# Patient Record
Sex: FEMALE | Race: WHITE | NOT HISPANIC OR LATINO | Employment: FULL TIME | URBAN - METROPOLITAN AREA
[De-identification: names, ages, dates, MRNs, and addresses within clinical notes are randomized per-mention and may not be internally consistent; named-entity substitution may affect disease eponyms.]

---
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
## 2021-05-15 VITALS — HEART RATE: 82 BPM | BODY MASS INDEX: 27.2 KG/M2 | WEIGHT: 190 LBS | OXYGEN SATURATION: 98 % | HEIGHT: 70 IN

## 2021-05-15 VITALS — OXYGEN SATURATION: 98 % | WEIGHT: 190 LBS | HEART RATE: 71 BPM | BODY MASS INDEX: 27.2 KG/M2 | HEIGHT: 70 IN

## 2021-05-15 VITALS — HEART RATE: 70 BPM | HEIGHT: 70 IN | OXYGEN SATURATION: 99 %

## 2021-05-15 VITALS — HEIGHT: 70 IN | WEIGHT: 160 LBS | BODY MASS INDEX: 22.9 KG/M2

## 2021-05-24 LAB
EXTERNAL ABO GROUPING: NORMAL
EXTERNAL HEPATITIS B SURFACE ANTIGEN: NEGATIVE
EXTERNAL RH FACTOR: POSITIVE
EXTERNAL RUBELLA QUALITATIVE: NORMAL
EXTERNAL VDRL: NORMAL
HIV1 P24 AG SERPL QL IA: NORMAL

## 2021-06-14 ENCOUNTER — HOSPITAL ENCOUNTER (EMERGENCY)
Facility: HOSPITAL | Age: 21
Discharge: HOME | End: 2021-06-14
Admitting: EMERGENCY MEDICINE

## 2021-06-14 VITALS
WEIGHT: 179.9 LBS | TEMPERATURE: 98.7 F | DIASTOLIC BLOOD PRESSURE: 68 MMHG | RESPIRATION RATE: 18 BRPM | HEIGHT: 72 IN | BODY MASS INDEX: 24.37 KG/M2 | OXYGEN SATURATION: 99 % | HEART RATE: 78 BPM | SYSTOLIC BLOOD PRESSURE: 128 MMHG

## 2021-06-14 DIAGNOSIS — O21.9: ICD-10-CM

## 2021-06-14 DIAGNOSIS — R30.0 DYSURIA: Primary | ICD-10-CM

## 2021-06-14 DIAGNOSIS — Z34.90 PREGNANCY, UNSPECIFIED GESTATIONAL AGE: ICD-10-CM

## 2021-06-14 LAB
ALBUMIN SERPL-MCNC: 4.3 G/DL (ref 3.5–5.2)
ALBUMIN/GLOB SERPL: 1.4 G/DL
ALP SERPL-CCNC: 66 U/L (ref 39–117)
ALT SERPL W P-5'-P-CCNC: 15 U/L (ref 1–33)
ANION GAP SERPL CALCULATED.3IONS-SCNC: 11.3 MMOL/L (ref 5–15)
AST SERPL-CCNC: 18 U/L (ref 1–32)
BASOPHILS # BLD AUTO: 0.03 10*3/MM3 (ref 0–0.2)
BASOPHILS NFR BLD AUTO: 0.3 % (ref 0–1.5)
BILIRUB SERPL-MCNC: 0.2 MG/DL (ref 0–1.2)
BILIRUB UR QL STRIP: NEGATIVE
BUN SERPL-MCNC: 5 MG/DL (ref 6–20)
BUN/CREAT SERPL: 9.8 (ref 7–25)
CALCIUM SPEC-SCNC: 9.4 MG/DL (ref 8.6–10.5)
CHLORIDE SERPL-SCNC: 103 MMOL/L (ref 98–107)
CLARITY UR: CLEAR
CO2 SERPL-SCNC: 20.7 MMOL/L (ref 22–29)
COLOR UR: YELLOW
CREAT SERPL-MCNC: 0.51 MG/DL (ref 0.57–1)
DEPRECATED RDW RBC AUTO: 40.3 FL (ref 37–54)
EOSINOPHIL # BLD AUTO: 0.27 10*3/MM3 (ref 0–0.4)
EOSINOPHIL NFR BLD AUTO: 3 % (ref 0.3–6.2)
ERYTHROCYTE [DISTWIDTH] IN BLOOD BY AUTOMATED COUNT: 13.1 % (ref 12.3–15.4)
GFR SERPL CREATININE-BSD FRML MDRD: >150 ML/MIN/1.73
GLOBULIN UR ELPH-MCNC: 3 GM/DL
GLUCOSE SERPL-MCNC: 85 MG/DL (ref 65–99)
GLUCOSE UR STRIP-MCNC: NEGATIVE MG/DL
HCG INTACT+B SERPL-ACNC: NORMAL MIU/ML
HCT VFR BLD AUTO: 39.6 % (ref 34–46.6)
HGB BLD-MCNC: 13.1 G/DL (ref 12–15.9)
HGB UR QL STRIP.AUTO: NEGATIVE
HOLD SPECIMEN: NORMAL
HOLD SPECIMEN: NORMAL
IMM GRANULOCYTES # BLD AUTO: 0.02 10*3/MM3 (ref 0–0.05)
IMM GRANULOCYTES NFR BLD AUTO: 0.2 % (ref 0–0.5)
KETONES UR QL STRIP: NEGATIVE
LEUKOCYTE ESTERASE UR QL STRIP.AUTO: NEGATIVE
LIPASE SERPL-CCNC: 33 U/L (ref 13–60)
LYMPHOCYTES # BLD AUTO: 2.29 10*3/MM3 (ref 0.7–3.1)
LYMPHOCYTES NFR BLD AUTO: 25.8 % (ref 19.6–45.3)
MCH RBC QN AUTO: 28.1 PG (ref 26.6–33)
MCHC RBC AUTO-ENTMCNC: 33.1 G/DL (ref 31.5–35.7)
MCV RBC AUTO: 85 FL (ref 79–97)
MONOCYTES # BLD AUTO: 0.6 10*3/MM3 (ref 0.1–0.9)
MONOCYTES NFR BLD AUTO: 6.8 % (ref 5–12)
NEUTROPHILS NFR BLD AUTO: 5.67 10*3/MM3 (ref 1.7–7)
NEUTROPHILS NFR BLD AUTO: 63.9 % (ref 42.7–76)
NITRITE UR QL STRIP: NEGATIVE
NRBC BLD AUTO-RTO: 0 /100 WBC (ref 0–0.2)
PH UR STRIP.AUTO: 7.5 [PH] (ref 5–8)
PLATELET # BLD AUTO: 187 10*3/MM3 (ref 140–450)
PMV BLD AUTO: 10.4 FL (ref 6–12)
POTASSIUM SERPL-SCNC: 3.8 MMOL/L (ref 3.5–5.2)
PROT SERPL-MCNC: 7.3 G/DL (ref 6–8.5)
PROT UR QL STRIP: NEGATIVE
RBC # BLD AUTO: 4.66 10*6/MM3 (ref 3.77–5.28)
SODIUM SERPL-SCNC: 135 MMOL/L (ref 136–145)
SP GR UR STRIP: 1.01 (ref 1–1.03)
UROBILINOGEN UR QL STRIP: NORMAL
WBC # BLD AUTO: 8.88 10*3/MM3 (ref 3.4–10.8)
WHOLE BLOOD HOLD SPECIMEN: NORMAL

## 2021-06-14 PROCEDURE — 63710000001 ONDANSETRON ODT 4 MG TABLET DISPERSIBLE: Performed by: NURSE PRACTITIONER

## 2021-06-14 PROCEDURE — 84702 CHORIONIC GONADOTROPIN TEST: CPT

## 2021-06-14 PROCEDURE — 80053 COMPREHEN METABOLIC PANEL: CPT

## 2021-06-14 PROCEDURE — 81003 URINALYSIS AUTO W/O SCOPE: CPT

## 2021-06-14 PROCEDURE — 85025 COMPLETE CBC W/AUTO DIFF WBC: CPT

## 2021-06-14 PROCEDURE — 99283 EMERGENCY DEPT VISIT LOW MDM: CPT

## 2021-06-14 PROCEDURE — 83690 ASSAY OF LIPASE: CPT

## 2021-06-14 RX ORDER — SODIUM CHLORIDE 0.9 % (FLUSH) 0.9 %
10 SYRINGE (ML) INJECTION AS NEEDED
Status: DISCONTINUED | OUTPATIENT
Start: 2021-06-14 | End: 2021-06-14 | Stop reason: HOSPADM

## 2021-06-14 RX ORDER — PRENATAL VIT NO.126/IRON/FOLIC 28MG-0.8MG
TABLET ORAL DAILY
COMMUNITY
End: 2022-03-16

## 2021-06-14 RX ORDER — ONDANSETRON 4 MG/1
4 TABLET, ORALLY DISINTEGRATING ORAL ONCE
Status: COMPLETED | OUTPATIENT
Start: 2021-06-14 | End: 2021-06-14

## 2021-06-14 RX ADMIN — ONDANSETRON 4 MG: 4 TABLET, ORALLY DISINTEGRATING ORAL at 07:48

## 2021-06-14 RX ADMIN — SODIUM CHLORIDE 1000 ML: 9 INJECTION, SOLUTION INTRAVENOUS at 07:47

## 2021-06-14 NOTE — ED NOTES
Patient is 14 weeks pregnant. Dr. Chappell at [facility]. Due Date December 9th     Brenda Kline RN  06/14/21 0590

## 2021-06-14 NOTE — ED PROVIDER NOTES
Subjective   Dysuria and vomiting.  14 weeks pregnant (dr. presley)      Dysuria  Pain quality:  Burning  Pain severity:  No pain  Onset quality:  Sudden  Duration: last night.  Timing:  Constant  Progression:  Unchanged  Chronicity:  New  Recent urinary tract infections: no    Relieved by:  None tried  Worsened by:  Nothing  Ineffective treatments:  None tried  Urinary symptoms: discolored urine, foul-smelling urine and frequent urination    Associated symptoms: nausea and vomiting    Associated symptoms: no fever, no flank pain and no vaginal discharge    Risk factors: pregnant        Review of Systems   Constitutional: Negative for fever.   Gastrointestinal: Positive for nausea and vomiting.   Genitourinary: Positive for dysuria. Negative for flank pain and vaginal discharge.   All other systems reviewed and are negative.      Past Medical History:   Diagnosis Date   • Pregnancy        No Known Allergies    Past Surgical History:   Procedure Laterality Date   • KNEE ARTHROSCOPY W/ MENISCECTOMY Left    • TONSILLECTOMY         History reviewed. No pertinent family history.    Social History     Socioeconomic History   • Marital status: Single     Spouse name: Not on file   • Number of children: Not on file   • Years of education: Not on file   • Highest education level: Not on file   Tobacco Use   • Smoking status: Never Smoker   • Smokeless tobacco: Never Used   Substance and Sexual Activity   • Alcohol use: Never   • Drug use: Never           Objective   Physical Exam  Vitals and nursing note reviewed.   Constitutional:       Appearance: Normal appearance.   HENT:      Head: Normocephalic.      Nose: Nose normal.      Mouth/Throat:      Mouth: Mucous membranes are dry.      Pharynx: Oropharynx is clear.   Eyes:      Pupils: Pupils are equal, round, and reactive to light.   Cardiovascular:      Rate and Rhythm: Normal rate and regular rhythm.      Pulses: Normal pulses.      Heart sounds: Normal heart sounds.    Pulmonary:      Effort: Pulmonary effort is normal.      Breath sounds: Normal breath sounds.   Abdominal:      General: Abdomen is flat.      Palpations: Abdomen is soft.      Tenderness: There is no right CVA tenderness, left CVA tenderness or guarding.   Musculoskeletal:         General: Normal range of motion.      Cervical back: Normal range of motion.   Skin:     General: Skin is warm and dry.   Neurological:      General: No focal deficit present.      Mental Status: She is alert.   Psychiatric:         Mood and Affect: Mood normal.         Procedures           ED Course                                           MDM  Number of Diagnoses or Management Options  Dysuria: established and improving  Pregnancy, unspecified gestational age: established and improving  Vomiting as reason for care in pregnancy: established and improving     Amount and/or Complexity of Data Reviewed  Clinical lab tests: reviewed and ordered  Tests in the medicine section of CPT®: ordered and reviewed    Risk of Complications, Morbidity, and/or Mortality  Presenting problems: low  Diagnostic procedures: low  Management options: low    Patient Progress  Patient progress: improved      Final diagnoses:   Dysuria   Vomiting as reason for care in pregnancy   Pregnancy, unspecified gestational age       ED Disposition  ED Disposition     ED Disposition Condition Comment    Discharge Stable           Desi Chappell MD  [address] [facility] [address]  [phone]      As needed         Medication List      No changes were made to your prescriptions during this visit.          Xiao Ortiz, APRN  06/14/21 3441

## 2021-07-09 ENCOUNTER — TRANSCRIBE ORDERS (OUTPATIENT)
Dept: ADMINISTRATIVE | Facility: HOSPITAL | Age: 21
End: 2021-07-09

## 2021-07-09 DIAGNOSIS — Z36.89 ENCOUNTER FOR FETAL ANATOMIC SURVEY: Primary | ICD-10-CM

## 2021-07-22 ENCOUNTER — HOSPITAL ENCOUNTER (OUTPATIENT)
Dept: ULTRASOUND IMAGING | Facility: HOSPITAL | Age: 21
Discharge: HOME | End: 2021-07-22
Admitting: OBSTETRICS & GYNECOLOGY

## 2021-07-22 DIAGNOSIS — Z36.89 ENCOUNTER FOR FETAL ANATOMIC SURVEY: ICD-10-CM

## 2021-07-22 PROCEDURE — 76811 OB US DETAILED SNGL FETUS: CPT

## 2021-07-25 ENCOUNTER — HOSPITAL ENCOUNTER (EMERGENCY)
Facility: HOSPITAL | Age: 21
End: 2021-07-25

## 2021-07-25 ENCOUNTER — HOSPITAL ENCOUNTER (OUTPATIENT)
Facility: HOSPITAL | Age: 21
End: 2021-07-25
Attending: OBSTETRICS & GYNECOLOGY | Admitting: OBSTETRICS & GYNECOLOGY

## 2021-07-25 ENCOUNTER — HOSPITAL ENCOUNTER (OUTPATIENT)
Facility: HOSPITAL | Age: 21
Discharge: HOME | End: 2021-07-25
Attending: OBSTETRICS & GYNECOLOGY | Admitting: OBSTETRICS & GYNECOLOGY

## 2021-07-25 VITALS
TEMPERATURE: 98.3 F | WEIGHT: 185 LBS | SYSTOLIC BLOOD PRESSURE: 128 MMHG | RESPIRATION RATE: 18 BRPM | BODY MASS INDEX: 25.06 KG/M2 | HEIGHT: 72 IN | DIASTOLIC BLOOD PRESSURE: 71 MMHG | HEART RATE: 85 BPM

## 2021-07-25 LAB
BILIRUB BLD-MCNC: NEGATIVE MG/DL
CLARITY, POC: CLEAR
COLOR UR: YELLOW
GLUCOSE UR STRIP-MCNC: NEGATIVE MG/DL
KETONES UR QL: NEGATIVE
LEUKOCYTE EST, POC: NEGATIVE
NITRITE UR-MCNC: NEGATIVE MG/ML
PH UR: 7 [PH] (ref 5–8)
PROT UR STRIP-MCNC: NEGATIVE MG/DL
RBC # UR STRIP: NEGATIVE /UL
SP GR UR: 1.01 (ref 1–1.03)
UROBILINOGEN UR QL: NORMAL

## 2021-07-25 PROCEDURE — G0463 HOSPITAL OUTPT CLINIC VISIT: HCPCS

## 2021-07-25 PROCEDURE — 81002 URINALYSIS NONAUTO W/O SCOPE: CPT | Performed by: OBSTETRICS & GYNECOLOGY

## 2021-07-25 NOTE — NURSING NOTE
Dr Chappell phoned to give report on pts arrival and complaints of vomiting, and spotting since this morning, pt also reports that she fell yesterday at work.   Fetal heart tones dopplered at 144 sve of closed and no contractions noted on the monitor.     Orders to check her specific in urine, offer reassurance, pelvic rest until next office visit, may discharge home.

## 2021-07-25 NOTE — H&P
SUNNY Graf  Obstetric History and Physical    Chief Complaint   Patient presents with   • Vaginal Bleeding   • Vomiting       Subjective     Patient is a . 21 y.o. female  currently at 20w3d, who presents with spotting and nausea and vomiting.  During the course of her assessment she indicated to the nurse that she had fallen yesterday and hit her abdomen.  She reports that the symptoms were noticed since that time.    Her prenatal care is benign.  Her previous obstetric/gynecological history is noted for is non-contributory.    The following portions of the patients history were reviewed and updated as appropriate: current medications, allergies, past medical history, past surgical history, past family history, past social history and problem list .       Prenatal Information:  Prenatal Results     POC Urine Glucose/Protein     Test Value Reference Range Date Time    Urine Glucose  Negative mg/dL Negative, 1000 mg/dL (3+) 21 1334    Urine Protein  Negative mg/dL Negative 21 1334          Initial Prenatal Labs     Test Value Reference Range Date Time    Hemoglobin  13.1 g/dL 12.0 - 15.9 21 0734       13.7 g/dL 12.0 - 16.0 21 1741       12.5 g/dL 12.0 - 16.0 21 2200    Hematocrit  39.6 % 34.0 - 46.6 21 0734       41.7 % 37.0 - 47.0 21 1741       37.0 % 37.0 - 47.0 21 2200    Platelets  187 10*3/mm3 140 - 450 21 0734       242 10*3/uL 130 - 400 21 1741       257 10*3/uL 130 - 400 21 2200    Rubella IgG        Hepatitis B SAg        Hepatitis C Ab        RPR        ABO        Rh  RH TYPE*POS                                    *21*19:53*AAJ NA  21 1741    Antibody Screen        HIV        Urine Culture  Escherichia coli   21 1845       Escherichia coli   21 2146    Gonorrhea        Chlamydia        TSH  1.100 m[iU]/L 0.270 - 4.200 20 1647          2nd and 3rd Trimester     Test Value Reference Range Date Time     Hemoglobin (repeated)        Hematocrit (repeated)        GCT        Antibody Screen (repeated)        GTT Fasting        GTT 1 Hr        GTT 2 Hr        GTT 3 Hr        Group B Strep              Drug Screening     Test Value Reference Range Date Time    Amphetamine Screen        Barbiturate Screen        Benzodiazepine Screen        Methadone Screen        Phencyclidine Screen        Opiates Screen        THC Screen        Cocaine Screen        Propoxyphene Screen        Buprenorphine Screen        Methamphetamine Screen        Oxycodone Screen        Tricyclic Antidepressants Screen              Other (Risk screening)     Test Value Reference Range Date Time    Varicella IgG        Parvovirus IgG        CMV IgG        Cystic Fibrosis        Hemoglobin electrophoresis        NIPT        MSAFP-4        AFP (for NTD only)              Legend    ^: Historical                      External Prenatal Results     Pregnancy Outside Results - Transcribed From Office Records - See Scanned Records For Details     Test Value Date Time    ABO       Rh  RH TYPE*POS                                    *05/20/21*19:53*AAJ NA 05/20/21 1741    Antibody Screen       Varicella IgG       Rubella       Hgb  13.1 g/dL 06/14/21 0734       13.7 g/dL 05/20/21 1741       12.5 g/dL 05/02/21 2200    Hct  39.6 % 06/14/21 0734       41.7 % 05/20/21 1741       37.0 % 05/02/21 2200    Glucose Fasting GTT       Glucose Tolerance Test 1 hour       Glucose Tolerance Test 3 hour       Gonorrhea (discrete)       Chlamydia (discrete)       RPR       VDRL       Syphilis Antibody       HBsAg       Herpes Simplex Virus PCR       Herpes Simplex VIrus Culture       HIV       Hep C RNA Quant PCR       Hep C Antibody       AFP       Group B Strep       GBS Susceptibility to Clindamycin       GBS Susceptibility to Erythromycin       Fetal Fibronectin       Genetic Testing, Maternal Blood             Drug Screening     Test Value Date Time    Urine Drug Screen        Amphetamine Screen       Barbiturate Screen       Benzodiazepine Screen       Methadone Screen       Phencyclidine Screen       Opiates Screen       THC Screen       Cocaine Screen       Propoxyphene Screen       Buprenorphine Screen       Methamphetamine Screen       Oxycodone Screen       Tricyclic Antidepressants Screen             Legend    ^: Historical                         Past OB History:     OB History    Para Term  AB Living   1 0 0 0 0 0   SAB TAB Ectopic Molar Multiple Live Births   0 0 0 0 0 0      # Outcome Date GA Lbr Mateusz/2nd Weight Sex Delivery Anes PTL Lv   1 Current                Past Medical History: Past Medical History:   Diagnosis Date   • Pregnancy       Past Surgical History Past Surgical History:   Procedure Laterality Date   • KNEE ARTHROSCOPY W/ MENISCECTOMY Left    • TONSILLECTOMY        Family History: No family history on file.   Social History:  reports that she has never smoked. She has never used smokeless tobacco.   reports no history of alcohol use.   reports no history of drug use.        General ROS: Pertinent items are noted in HPI, all other systems reviewed and negative    Objective       Vital Signs Range for the last 24 hours  Temperature: Temp:  [98.3 °F (36.8 °C)] 98.3 °F (36.8 °C)   Temp Source: Temp src: Oral   BP: BP: (128)/(71) 128/71   Pulse: Heart Rate:  [85] 85   Respirations: Resp:  [18] 18   SPO2:     O2 Amount (l/min):     O2 Devices     Weight: Weight:  [83.9 kg (185 lb)] 83.9 kg (185 lb)     Physical Examination: General appearance - alert, well appearing, and in no distress  Neck - supple, no significant adenopathy  Chest - clear to auscultation, no wheezes, rales or rhonchi, symmetric air entry  Heart - normal rate, regular rhythm, normal S1, S2, no murmurs, rubs, clicks or gallops  Abdomen -gravid, nontender, no rebound or guarding; no contractions to palpation or tocodynamometry  Pelvic -as noted by the nursing staff.  Neurological -  alert, oriented, normal speech, no focal findings or movement disorder noted  Extremities - no pedal edema noted  Skin - normal coloration and turgor, no rashes, no suspicious skin lesions noted        Cervix: Exam by: Method: sterile exam per RN   Dilation: Cervical Dilation (cm): 0   Effacement: Cervical Effacement: 0%   Station:         Fetal Heart Rate Assessment   Method: Fetal HR Assessment Method: intermittent auscultation, using Doppler   Beats/min: Fetal HR (beats/min): 146   Baseline:     Variability:     Accels:     Decels:     Tracing Category:       Uterine Assessment   Method: Method: palpation, external tocotransducer   Frequency (min):     Ctx Count in 10 min:     Duration:     Intensity: Contraction Intensity: no contractions   Intensity by IUPC:     Resting Tone:     Resting Tone by IUPC:     [place] Units:           Assessment/Plan       * No active hospital problems. *        Assessment:  1.  Intrauterine pregnancy at 20w3d gestation with normal fetal heart tones and fetal status.    2.  vaginal bleeding and recent abdominal trauma no current evidence of bleeding  3.  Obstetrical history significant for is non-contributory.  4.  GBS status: No results found for: [id number]    Plan:  1. anti-emetics, stool softener, pelvic rest, discharge to home.  Patient also provided with a work note for today as requested.  She will keep her appointment at our office as scheduled.  2. Plan of care has been reviewed with patient  3.  Risks, benefits of treatment plan have been discussed.  4.  All questions have been answered  5.  Return with any worse or persistent symptoms    Desi Chappell MD  7/25/2021  13:47 EDT

## 2021-09-27 ENCOUNTER — HOSPITAL ENCOUNTER (OUTPATIENT)
Facility: HOSPITAL | Age: 21
Discharge: HOME | End: 2021-09-27
Attending: OBSTETRICS & GYNECOLOGY | Admitting: OBSTETRICS & GYNECOLOGY

## 2021-09-27 VITALS
DIASTOLIC BLOOD PRESSURE: 85 MMHG | SYSTOLIC BLOOD PRESSURE: 133 MMHG | TEMPERATURE: 98.3 F | WEIGHT: 203 LBS | BODY MASS INDEX: 27.5 KG/M2 | HEIGHT: 72 IN | HEART RATE: 95 BPM

## 2021-09-27 PROCEDURE — G0463 HOSPITAL OUTPT CLINIC VISIT: HCPCS

## 2021-09-27 PROCEDURE — 59025 FETAL NON-STRESS TEST: CPT

## 2021-09-27 NOTE — SIGNIFICANT NOTE
"   09/27/21 1149   Nonstress Test   Reason for NST Other (Comment)  (sent from office. decreased fm. nst)   Uterine Irritability No   Contractions Not present   Fetal Assessment   Fetal Movement active   Fetal HR Assessment Method external   Fetal HR (beats/min) 140   Fetal Heart Baseline Rate normal range   Fetal HR Variability moderate (amplitude range 6 to 25 bpm)   Fetal HR Accelerations greater than/equal to 10 bpm (32 wks gest or less);lasts at least 10 seconds (32 wks gest or less)   Fetal HR Decelerations absent   Sinusoidal Pattern Present absent   Interpretation A   Nonstress Test Interpretation A Reactive   Patient Vitals for the past 24 hrs:   BP Temp Temp src Pulse Height Weight   09/27/21 1131 133/85 -- -- 95 -- --   09/27/21 1126 135/77 98.3 °F (36.8 °C) Oral 87 -- --   09/27/21 1116 -- -- -- -- 182.9 cm (72\") 92.1 kg (203 lb)    29w4d     Active fetal movement during visit.   "

## 2021-10-01 ENCOUNTER — HOSPITAL ENCOUNTER (OUTPATIENT)
Facility: HOSPITAL | Age: 21
End: 2021-10-01
Attending: OBSTETRICS & GYNECOLOGY | Admitting: OBSTETRICS & GYNECOLOGY

## 2021-10-01 ENCOUNTER — HOSPITAL ENCOUNTER (OUTPATIENT)
Facility: HOSPITAL | Age: 21
Discharge: HOME | End: 2021-10-01
Attending: OBSTETRICS & GYNECOLOGY | Admitting: OBSTETRICS & GYNECOLOGY

## 2021-10-01 ENCOUNTER — HOSPITAL ENCOUNTER (EMERGENCY)
Facility: HOSPITAL | Age: 21
End: 2021-10-01

## 2021-10-01 VITALS
SYSTOLIC BLOOD PRESSURE: 110 MMHG | TEMPERATURE: 98.7 F | RESPIRATION RATE: 20 BRPM | OXYGEN SATURATION: 99 % | DIASTOLIC BLOOD PRESSURE: 52 MMHG | HEART RATE: 66 BPM

## 2021-10-01 LAB
BACTERIA UR QL AUTO: ABNORMAL /HPF
BILIRUB UR QL STRIP: NEGATIVE
CLARITY UR: ABNORMAL
COLOR UR: YELLOW
GLUCOSE UR STRIP-MCNC: NEGATIVE MG/DL
HGB UR QL STRIP.AUTO: ABNORMAL
HYALINE CASTS UR QL AUTO: ABNORMAL /LPF
KETONES UR QL STRIP: NEGATIVE
LEUKOCYTE ESTERASE UR QL STRIP.AUTO: ABNORMAL
NITRITE UR QL STRIP: NEGATIVE
PH UR STRIP.AUTO: 6 [PH] (ref 5–8)
PROT UR QL STRIP: ABNORMAL
RBC # UR: ABNORMAL /HPF
REF LAB TEST METHOD: ABNORMAL
SP GR UR STRIP: 1.01 (ref 1–1.03)
SQUAMOUS #/AREA URNS HPF: ABNORMAL /HPF
UROBILINOGEN UR QL STRIP: ABNORMAL
WBC UR QL AUTO: ABNORMAL /HPF

## 2021-10-01 PROCEDURE — 96372 THER/PROPH/DIAG INJ SC/IM: CPT

## 2021-10-01 PROCEDURE — 87086 URINE CULTURE/COLONY COUNT: CPT | Performed by: OBSTETRICS & GYNECOLOGY

## 2021-10-01 PROCEDURE — 81001 URINALYSIS AUTO W/SCOPE: CPT | Performed by: OBSTETRICS & GYNECOLOGY

## 2021-10-01 PROCEDURE — 87077 CULTURE AEROBIC IDENTIFY: CPT | Performed by: OBSTETRICS & GYNECOLOGY

## 2021-10-01 PROCEDURE — G0463 HOSPITAL OUTPT CLINIC VISIT: HCPCS

## 2021-10-01 PROCEDURE — 59025 FETAL NON-STRESS TEST: CPT

## 2021-10-01 PROCEDURE — 87186 SC STD MICRODIL/AGAR DIL: CPT | Performed by: OBSTETRICS & GYNECOLOGY

## 2021-10-01 PROCEDURE — 25010000003 LIDOCAINE 1 % SOLUTION: Performed by: OBSTETRICS & GYNECOLOGY

## 2021-10-01 PROCEDURE — 25010000002 CEFTRIAXONE PER 250 MG: Performed by: OBSTETRICS & GYNECOLOGY

## 2021-10-01 RX ORDER — CEFTRIAXONE 1 G/1
1 INJECTION, POWDER, FOR SOLUTION INTRAMUSCULAR; INTRAVENOUS ONCE
Status: COMPLETED | OUTPATIENT
Start: 2021-10-01 | End: 2021-10-01

## 2021-10-01 RX ORDER — LIDOCAINE HYDROCHLORIDE 10 MG/ML
2.5 INJECTION, SOLUTION INFILTRATION; PERINEURAL ONCE
Status: COMPLETED | OUTPATIENT
Start: 2021-10-01 | End: 2021-10-01

## 2021-10-01 RX ADMIN — CEFTRIAXONE 1 G: 1 INJECTION, POWDER, FOR SOLUTION INTRAMUSCULAR; INTRAVENOUS at 11:44

## 2021-10-01 RX ADMIN — LIDOCAINE HYDROCHLORIDE 2.5 ML: 10 INJECTION, SOLUTION INFILTRATION; PERINEURAL at 11:44

## 2021-10-01 NOTE — NURSING NOTE
Dr. Araujo notified as on call for Dr. Chappell. Informed of patient's arrival with c/o urinary frequency and urgency, burning and pain upon urination for 3 days and worse last pm with chilling. at 30 1/7 weeks. States went to Dr. Chappell's office yesterday for same symptoms and urinalysis obtained and sent to lab. Dr. Chappell told her that she would be calling her today. Temperature 98.5. Clean catch u/a results read to Dr. Araujo. New order for Rocephin 1 gm IM to mix with lidocaine noted and may discharge home

## 2021-10-01 NOTE — SIGNIFICANT NOTE
10/01/21 1200   Nonstress Test   Reason for NST OB Triage;Other (Comment)  (burning upon urination, frequency, urgency. Chills last pm.)   Uterine Irritability No   Contractions Not present   Interpretation A   Nonstress Test Interpretation A Reactive   ./52 (BP Location: Left arm, Patient Position: Lying)   Pulse 66   Temp 98.7 °F (37.1 °C) (Oral)   Resp 20   LMP 03/04/2021 (Exact Date)   SpO2 99%   .30w1d

## 2021-10-02 NOTE — NON STRESS TEST
Obstetrical Non-stress Test Interpretation     Name:  Nayla Rodriguez  MRN: [id number]    21 y.o. female  at 30w2d     Indication: Lower abdominal pain with urinary frequency      Baseline: Normal 110-160 bpm  Variability:   Moderate/Normal (amplitude 6-25 bpm)  Accelerations: Present (<32 weeks) 10 x 10 bpm   Decelerations: Absent   Contractions:  Absent       Impression: Reactive NST.  Urinary tract infection    Plan: 1 g of Rocephin IM was given      Isidro Araujo MD  10/2/2021  09:19 EDT

## 2021-10-03 LAB — BACTERIA SPEC AEROBE CULT: ABNORMAL

## 2021-10-29 ENCOUNTER — HOSPITAL ENCOUNTER (INPATIENT)
Facility: HOSPITAL | Age: 21
LOS: 1 days | Discharge: HOME | End: 2021-10-29
Attending: OBSTETRICS & GYNECOLOGY | Admitting: OBSTETRICS & GYNECOLOGY

## 2021-10-29 ENCOUNTER — APPOINTMENT (OUTPATIENT)
Dept: ULTRASOUND IMAGING | Facility: HOSPITAL | Age: 21
End: 2021-10-29

## 2021-10-29 VITALS
DIASTOLIC BLOOD PRESSURE: 80 MMHG | BODY MASS INDEX: 28.93 KG/M2 | HEIGHT: 72 IN | OXYGEN SATURATION: 97 % | TEMPERATURE: 98.2 F | SYSTOLIC BLOOD PRESSURE: 128 MMHG | HEART RATE: 95 BPM | RESPIRATION RATE: 18 BRPM | WEIGHT: 213.63 LBS

## 2021-10-29 PROBLEM — R10.9 ABDOMINAL CRAMPING: Status: ACTIVE | Noted: 2021-10-29

## 2021-10-29 LAB
BASOPHILS # BLD AUTO: 0.02 10*3/MM3 (ref 0–0.2)
BASOPHILS NFR BLD AUTO: 0.1 % (ref 0–1.5)
DEPRECATED RDW RBC AUTO: 40.9 FL (ref 37–54)
EOSINOPHIL # BLD AUTO: 0.12 10*3/MM3 (ref 0–0.4)
EOSINOPHIL NFR BLD AUTO: 0.9 % (ref 0.3–6.2)
ERYTHROCYTE [DISTWIDTH] IN BLOOD BY AUTOMATED COUNT: 12.9 % (ref 12.3–15.4)
HCG INTACT+B SERPL-ACNC: 6529 MIU/ML
HCT VFR BLD AUTO: 36.4 % (ref 34–46.6)
HGB BLD-MCNC: 12.6 G/DL (ref 12–15.9)
HOLD SPECIMEN: NORMAL
HOLD SPECIMEN: NORMAL
IMM GRANULOCYTES # BLD AUTO: 0.06 10*3/MM3 (ref 0–0.05)
IMM GRANULOCYTES NFR BLD AUTO: 0.4 % (ref 0–0.5)
LYMPHOCYTES # BLD AUTO: 1.99 10*3/MM3 (ref 0.7–3.1)
LYMPHOCYTES NFR BLD AUTO: 14.8 % (ref 19.6–45.3)
MCH RBC QN AUTO: 30.1 PG (ref 26.6–33)
MCHC RBC AUTO-ENTMCNC: 34.6 G/DL (ref 31.5–35.7)
MCV RBC AUTO: 87.1 FL (ref 79–97)
MONOCYTES # BLD AUTO: 0.84 10*3/MM3 (ref 0.1–0.9)
MONOCYTES NFR BLD AUTO: 6.2 % (ref 5–12)
NEUTROPHILS NFR BLD AUTO: 10.43 10*3/MM3 (ref 1.7–7)
NEUTROPHILS NFR BLD AUTO: 77.6 % (ref 42.7–76)
NRBC BLD AUTO-RTO: 0 /100 WBC (ref 0–0.2)
PLATELET # BLD AUTO: 193 10*3/MM3 (ref 140–450)
PMV BLD AUTO: 10.1 FL (ref 6–12)
RBC # BLD AUTO: 4.18 10*6/MM3 (ref 3.77–5.28)
WBC # BLD AUTO: 13.46 10*3/MM3 (ref 3.4–10.8)
WHOLE BLOOD HOLD SPECIMEN: NORMAL
WHOLE BLOOD HOLD SPECIMEN: NORMAL

## 2021-10-29 PROCEDURE — 36415 COLL VENOUS BLD VENIPUNCTURE: CPT

## 2021-10-29 PROCEDURE — 99202 OFFICE O/P NEW SF 15 MIN: CPT

## 2021-10-29 PROCEDURE — 84702 CHORIONIC GONADOTROPIN TEST: CPT

## 2021-10-29 PROCEDURE — 76805 OB US >/= 14 WKS SNGL FETUS: CPT

## 2021-10-29 PROCEDURE — 85025 COMPLETE CBC W/AUTO DIFF WBC: CPT

## 2021-10-29 RX ORDER — SODIUM CHLORIDE 0.9 % (FLUSH) 0.9 %
10 SYRINGE (ML) INJECTION AS NEEDED
Status: DISCONTINUED | OUTPATIENT
Start: 2021-10-29 | End: 2021-10-29 | Stop reason: HOSPADM

## 2021-11-04 LAB — EXTERNAL GROUP B STREP ANTIGEN: POSITIVE

## 2021-11-10 ENCOUNTER — HOSPITAL ENCOUNTER (OUTPATIENT)
Facility: HOSPITAL | Age: 21
Discharge: HOME | End: 2021-11-10
Attending: OBSTETRICS & GYNECOLOGY | Admitting: OBSTETRICS & GYNECOLOGY

## 2021-11-10 VITALS
TEMPERATURE: 98.5 F | SYSTOLIC BLOOD PRESSURE: 125 MMHG | RESPIRATION RATE: 18 BRPM | HEART RATE: 86 BPM | DIASTOLIC BLOOD PRESSURE: 73 MMHG

## 2021-11-10 PROCEDURE — G0463 HOSPITAL OUTPT CLINIC VISIT: HCPCS

## 2021-11-10 PROCEDURE — 59025 FETAL NON-STRESS TEST: CPT

## 2021-11-10 NOTE — NURSING NOTE
DR LANIER CALLED AND NOTIFIED OF PATIENT ARRIVAL TO UNIT.  AT 35.6 WEEKS GESTATION. C/O CONTRACTIONS SINCE 1145 THIS MORNING. SVE /-2. PATIENT WAS 2CM AT LAST OFFICE VISIT. TRACING NOT REACTIVE AT THIS TIME, MODERATE VARIBILITY. IRRITABILITY NOTED ON MONITOR. URINE SG 1.030. PO HYDRATING AT THIS TIME. BLOOD PRESSURE, WNL. ONCE REACTIVE, PATIENT MAY BE DISCHARGED HOME WITH LABOR PRECAUTIONS AND EDUCATION ON HYDRATION IN PREGNANCY.

## 2021-11-10 NOTE — SIGNIFICANT NOTE
35w6d  /73   Pulse 86   Temp 98.5 °F (36.9 °C) (Oral)   Resp 18   LMP 03/04/2021 (Exact Date)   Reason for test: (P) OB Triage, Other (Comment) (contractions)  Date of Test: 11/10/2021  Time frame of test: 9540-2807  RN NST Interpretation: (P) Reactive       11/10/21 1335   Nonstress Test   Reason for NST OB Triage; Other (Comment)  (contractions)   Acoustic Stimulator No   Uterine Irritability Yes   Contractions Not present   Fetal Assessment   Fetal Movement active   Fetal HR Assessment Method external   Fetal HR (beats/min) 120   Fetal Heart Baseline Rate normal range   Fetal HR Variability moderate (amplitude range 6 to 25 bpm)   Fetal HR Accelerations greater than/equal to 15 bpm; lasting at least 15 seconds   Fetal HR Decelerations absent   Sinusoidal Pattern Present absent   Interpretation A   Nonstress Test Interpretation A Reactive

## 2021-11-18 ENCOUNTER — HOSPITAL ENCOUNTER (OUTPATIENT)
Facility: HOSPITAL | Age: 21
Discharge: HOME | End: 2021-11-18
Attending: OBSTETRICS & GYNECOLOGY | Admitting: OBSTETRICS & GYNECOLOGY

## 2021-11-18 ENCOUNTER — HOSPITAL ENCOUNTER (OUTPATIENT)
Facility: HOSPITAL | Age: 21
End: 2021-11-18
Attending: OBSTETRICS & GYNECOLOGY | Admitting: OBSTETRICS & GYNECOLOGY

## 2021-11-18 VITALS
DIASTOLIC BLOOD PRESSURE: 83 MMHG | TEMPERATURE: 98.2 F | SYSTOLIC BLOOD PRESSURE: 123 MMHG | RESPIRATION RATE: 18 BRPM | HEART RATE: 104 BPM

## 2021-11-18 PROCEDURE — 59025 FETAL NON-STRESS TEST: CPT

## 2021-11-18 PROCEDURE — G0463 HOSPITAL OUTPT CLINIC VISIT: HCPCS

## 2021-11-18 NOTE — SIGNIFICANT NOTE
37w0d  /83 (BP Location: Right arm, Patient Position: Sitting)   Pulse 104   Temp 98.2 °F (36.8 °C) (Oral)   Resp 18   LMP 03/04/2021 (Exact Date)     Reason for test: (P) Decreased fetal movement, OB Triage  Date of Test: 11/18/2021  Time frame of test: 7924-6320  RN NST Interpretation: (P) Reactive       11/18/21 1445   Nonstress Test   Reason for NST Decreased fetal movement; OB Triage   Acoustic Stimulator No   Uterine Irritability No   Contractions Not present   Fetal Assessment   Fetal Movement active   Fetal HR Assessment Method external   Fetal HR (beats/min) 140   Fetal Heart Baseline Rate normal range   Fetal HR Variability moderate (amplitude range 6 to 25 bpm)   Fetal HR Accelerations greater than/equal to 15 bpm; lasting at least 15 seconds   Fetal HR Decelerations absent   Sinusoidal Pattern Present absent   Interpretation A   Nonstress Test Interpretation A Reactive

## 2021-11-18 NOTE — NURSING NOTE
Dr Chappell called about patient coming to labor and delivery for NST. Patient states she has not felt movement for 2 days. Dr. Chappell states she did get doppler heartbeat in office. Per Dr. Chappell, perform NST once patient arrives. If NST reactive and blood pressure WNL, patient may be discharged home.

## 2021-11-20 ENCOUNTER — HOSPITAL ENCOUNTER (OUTPATIENT)
Facility: HOSPITAL | Age: 21
Discharge: HOME | End: 2021-11-21
Attending: OBSTETRICS & GYNECOLOGY | Admitting: OBSTETRICS & GYNECOLOGY

## 2021-11-20 PROCEDURE — G0463 HOSPITAL OUTPT CLINIC VISIT: HCPCS

## 2021-11-21 ENCOUNTER — HOSPITAL ENCOUNTER (INPATIENT)
Facility: HOSPITAL | Age: 21
LOS: 1 days | Discharge: HOME | End: 2021-11-22
Attending: OBSTETRICS & GYNECOLOGY | Admitting: OBSTETRICS & GYNECOLOGY

## 2021-11-21 VITALS
WEIGHT: 217 LBS | SYSTOLIC BLOOD PRESSURE: 135 MMHG | HEIGHT: 70 IN | DIASTOLIC BLOOD PRESSURE: 85 MMHG | BODY MASS INDEX: 31.07 KG/M2 | RESPIRATION RATE: 16 BRPM | HEART RATE: 79 BPM | TEMPERATURE: 98.5 F

## 2021-11-21 PROBLEM — O47.9 UTERINE CONTRACTIONS DURING PREGNANCY: Status: ACTIVE | Noted: 2021-11-21

## 2021-11-21 PROBLEM — O47.9 UTERINE CONTRACTIONS DURING PREGNANCY: Status: RESOLVED | Noted: 2021-11-21 | Resolved: 2021-11-21

## 2021-11-21 LAB
ABO GROUP BLD: NORMAL
ABO GROUP BLD: NORMAL
BASE EXCESS BLDCOA CALC-SCNC: -1.5 MMOL/L
BASE EXCESS BLDCOV CALC-SCNC: -2.2 MMOL/L
BLD GP AB SCN SERPL QL: NEGATIVE
DEPRECATED RDW RBC AUTO: 39.4 FL (ref 37–54)
ERYTHROCYTE [DISTWIDTH] IN BLOOD BY AUTOMATED COUNT: 12.8 % (ref 12.3–15.4)
HCO3 BLDCOA-SCNC: 21.4 MMOL/L
HCO3 BLDCOV-SCNC: 20.5 MMOL/L
HCT VFR BLD AUTO: 35.4 % (ref 34–46.6)
HGB BLD-MCNC: 12.3 G/DL (ref 12–15.9)
MCH RBC QN AUTO: 29.6 PG (ref 26.6–33)
MCHC RBC AUTO-ENTMCNC: 34.7 G/DL (ref 31.5–35.7)
MCV RBC AUTO: 85.3 FL (ref 79–97)
PCO2 BLDCOA: 31.4 MMHG (ref 33–49)
PCO2 BLDCOV: 30.1 MM HG (ref 28–40)
PH BLDCOA: 7.45 PH UNITS (ref 7.21–7.31)
PH BLDCOV: 7.45 PH UNITS (ref 7.31–7.37)
PLATELET # BLD AUTO: 208 10*3/MM3 (ref 140–450)
PMV BLD AUTO: 10.3 FL (ref 6–12)
PO2 BLDCOA: <40.5 MMHG
PO2 BLDCOV: <40.5 MM HG (ref 21–31)
RBC # BLD AUTO: 4.15 10*6/MM3 (ref 3.77–5.28)
RH BLD: POSITIVE
RH BLD: POSITIVE
T&S EXPIRATION DATE: NORMAL
WBC NRBC COR # BLD: 18.51 10*3/MM3 (ref 3.4–10.8)

## 2021-11-21 PROCEDURE — 86850 RBC ANTIBODY SCREEN: CPT | Performed by: OBSTETRICS & GYNECOLOGY

## 2021-11-21 PROCEDURE — 86900 BLOOD TYPING SEROLOGIC ABO: CPT | Performed by: OBSTETRICS & GYNECOLOGY

## 2021-11-21 PROCEDURE — 85027 COMPLETE CBC AUTOMATED: CPT | Performed by: OBSTETRICS & GYNECOLOGY

## 2021-11-21 PROCEDURE — 25010000002 KETOROLAC TROMETHAMINE PER 15 MG: Performed by: OBSTETRICS & GYNECOLOGY

## 2021-11-21 PROCEDURE — 86900 BLOOD TYPING SEROLOGIC ABO: CPT

## 2021-11-21 PROCEDURE — 82803 BLOOD GASES ANY COMBINATION: CPT | Performed by: OBSTETRICS & GYNECOLOGY

## 2021-11-21 PROCEDURE — 88307 TISSUE EXAM BY PATHOLOGIST: CPT | Performed by: OBSTETRICS & GYNECOLOGY

## 2021-11-21 PROCEDURE — 59025 FETAL NON-STRESS TEST: CPT

## 2021-11-21 PROCEDURE — 25010000002 AMPICILLIN PER 500 MG: Performed by: OBSTETRICS & GYNECOLOGY

## 2021-11-21 PROCEDURE — 86901 BLOOD TYPING SEROLOGIC RH(D): CPT | Performed by: OBSTETRICS & GYNECOLOGY

## 2021-11-21 PROCEDURE — 86901 BLOOD TYPING SEROLOGIC RH(D): CPT

## 2021-11-21 RX ORDER — KETOROLAC TROMETHAMINE 30 MG/ML
30 INJECTION, SOLUTION INTRAMUSCULAR; INTRAVENOUS EVERY 6 HOURS PRN
Status: DISCONTINUED | OUTPATIENT
Start: 2021-11-21 | End: 2021-11-21

## 2021-11-21 RX ORDER — OXYTOCIN-SODIUM CHLORIDE 0.9% IV SOLN 30 UNIT/500ML 30-0.9/5 UT/ML-%
50 SOLUTION INTRAVENOUS CONTINUOUS
Status: DISCONTINUED | OUTPATIENT
Start: 2021-11-21 | End: 2021-11-21

## 2021-11-21 RX ORDER — METHYLERGONOVINE MALEATE 0.2 MG/ML
200 INJECTION INTRAVENOUS ONCE AS NEEDED
Status: DISCONTINUED | OUTPATIENT
Start: 2021-11-21 | End: 2021-11-21 | Stop reason: HOSPADM

## 2021-11-21 RX ORDER — KETOROLAC TROMETHAMINE 30 MG/ML
30 INJECTION, SOLUTION INTRAMUSCULAR; INTRAVENOUS ONCE
Status: DISCONTINUED | OUTPATIENT
Start: 2021-11-21 | End: 2021-11-21

## 2021-11-21 RX ORDER — SODIUM CHLORIDE 0.9 % (FLUSH) 0.9 %
1-10 SYRINGE (ML) INJECTION AS NEEDED
Status: DISCONTINUED | OUTPATIENT
Start: 2021-11-21 | End: 2021-11-22 | Stop reason: HOSPADM

## 2021-11-21 RX ORDER — ONDANSETRON 2 MG/ML
4 INJECTION INTRAMUSCULAR; INTRAVENOUS EVERY 6 HOURS PRN
Status: DISCONTINUED | OUTPATIENT
Start: 2021-11-21 | End: 2021-11-22 | Stop reason: HOSPADM

## 2021-11-21 RX ORDER — HYDROCODONE BITARTRATE AND ACETAMINOPHEN 5; 325 MG/1; MG/1
1 TABLET ORAL EVERY 4 HOURS PRN
Status: DISCONTINUED | OUTPATIENT
Start: 2021-11-21 | End: 2021-11-22 | Stop reason: HOSPADM

## 2021-11-21 RX ORDER — CARBOPROST TROMETHAMINE 250 UG/ML
250 INJECTION, SOLUTION INTRAMUSCULAR
Status: DISCONTINUED | OUTPATIENT
Start: 2021-11-21 | End: 2021-11-21 | Stop reason: HOSPADM

## 2021-11-21 RX ORDER — DOCUSATE SODIUM 100 MG/1
100 CAPSULE, LIQUID FILLED ORAL 2 TIMES DAILY
Status: DISCONTINUED | OUTPATIENT
Start: 2021-11-21 | End: 2021-11-22 | Stop reason: HOSPADM

## 2021-11-21 RX ORDER — MISOPROSTOL 200 UG/1
TABLET ORAL
Status: COMPLETED
Start: 2021-11-21 | End: 2021-11-21

## 2021-11-21 RX ORDER — LIDOCAINE HYDROCHLORIDE 10 MG/ML
INJECTION, SOLUTION EPIDURAL; INFILTRATION; INTRACAUDAL; PERINEURAL
Status: COMPLETED
Start: 2021-11-21 | End: 2021-11-21

## 2021-11-21 RX ORDER — ONDANSETRON 4 MG/1
4 TABLET, FILM COATED ORAL EVERY 6 HOURS PRN
Status: DISCONTINUED | OUTPATIENT
Start: 2021-11-21 | End: 2021-11-22 | Stop reason: HOSPADM

## 2021-11-21 RX ORDER — MISOPROSTOL 200 UG/1
800 TABLET ORAL ONCE AS NEEDED
Status: DISCONTINUED | OUTPATIENT
Start: 2021-11-21 | End: 2021-11-21 | Stop reason: HOSPADM

## 2021-11-21 RX ORDER — KETOROLAC TROMETHAMINE 30 MG/ML
INJECTION, SOLUTION INTRAMUSCULAR; INTRAVENOUS
Status: DISPENSED
Start: 2021-11-21 | End: 2021-11-21

## 2021-11-21 RX ORDER — IBUPROFEN 600 MG/1
600 TABLET ORAL EVERY 6 HOURS
Status: DISCONTINUED | OUTPATIENT
Start: 2021-11-21 | End: 2021-11-22 | Stop reason: HOSPADM

## 2021-11-21 RX ORDER — CALCIUM CARBONATE 200(500)MG
2 TABLET,CHEWABLE ORAL 3 TIMES DAILY PRN
Status: DISCONTINUED | OUTPATIENT
Start: 2021-11-21 | End: 2021-11-22 | Stop reason: HOSPADM

## 2021-11-21 RX ORDER — KETOROLAC TROMETHAMINE 30 MG/ML
30 INJECTION, SOLUTION INTRAMUSCULAR; INTRAVENOUS ONCE
Status: COMPLETED | OUTPATIENT
Start: 2021-11-21 | End: 2021-11-21

## 2021-11-21 RX ORDER — MAGNESIUM CARB/ALUMINUM HYDROX 105-160MG
30 TABLET,CHEWABLE ORAL ONCE
Status: DISCONTINUED | OUTPATIENT
Start: 2021-11-21 | End: 2021-11-21

## 2021-11-21 RX ORDER — SODIUM CHLORIDE, SODIUM LACTATE, POTASSIUM CHLORIDE, CALCIUM CHLORIDE 600; 310; 30; 20 MG/100ML; MG/100ML; MG/100ML; MG/100ML
125 INJECTION, SOLUTION INTRAVENOUS CONTINUOUS
Status: DISCONTINUED | OUTPATIENT
Start: 2021-11-21 | End: 2021-11-21

## 2021-11-21 RX ADMIN — IBUPROFEN 600 MG: 600 TABLET ORAL at 21:09

## 2021-11-21 RX ADMIN — KETOROLAC TROMETHAMINE 30 MG: 30 INJECTION, SOLUTION INTRAMUSCULAR; INTRAVENOUS at 02:39

## 2021-11-21 RX ADMIN — WITCH HAZEL 1 PAD: 500 SOLUTION RECTAL; TOPICAL at 09:15

## 2021-11-21 RX ADMIN — IBUPROFEN 600 MG: 600 TABLET ORAL at 09:25

## 2021-11-21 RX ADMIN — OXYTOCIN 50 ML/HR: 10 INJECTION, SOLUTION INTRAMUSCULAR; INTRAVENOUS at 07:31

## 2021-11-21 RX ADMIN — IBUPROFEN 600 MG: 600 TABLET ORAL at 14:10

## 2021-11-21 RX ADMIN — MISOPROSTOL 600 MCG: 200 TABLET ORAL at 02:45

## 2021-11-21 RX ADMIN — HYDROCODONE BITARTRATE AND ACETAMINOPHEN 1 TABLET: 5; 325 TABLET ORAL at 18:25

## 2021-11-21 RX ADMIN — OXYTOCIN 50 ML/HR: 10 INJECTION, SOLUTION INTRAMUSCULAR; INTRAVENOUS at 02:33

## 2021-11-21 RX ADMIN — DOCUSATE SODIUM 100 MG: 100 CAPSULE, LIQUID FILLED ORAL at 21:09

## 2021-11-21 RX ADMIN — LIDOCAINE HYDROCHLORIDE: 10 INJECTION, SOLUTION EPIDURAL; INFILTRATION; INTRACAUDAL; PERINEURAL at 02:25

## 2021-11-21 RX ADMIN — BENZOCAINE AND LEVOMENTHOL: 200; 5 SPRAY TOPICAL at 09:15

## 2021-11-21 RX ADMIN — SODIUM CHLORIDE, POTASSIUM CHLORIDE, SODIUM LACTATE AND CALCIUM CHLORIDE 1000 ML: 600; 310; 30; 20 INJECTION, SOLUTION INTRAVENOUS at 02:15

## 2021-11-21 RX ADMIN — Medication 2 G: at 02:20

## 2021-11-21 RX ADMIN — DOCUSATE SODIUM 100 MG: 100 CAPSULE, LIQUID FILLED ORAL at 09:24

## 2021-11-21 NOTE — H&P
SUNNY Graf  Obstetric History and Physical    Chief Complaint   Patient presents with   • Contractions       Subjective     Patient is a 21 y.o. female  currently at 37w3d, who presents with cpainful contractions.  She was seen earlier tonight and was sent home in early labor.  When she returned she was 9cm and delivered shortly thereafter.    Her prenatal care is benign.  Her previous obstetric/gynecological history is noted for is non-contributory.    The following portions of the patients history were reviewed and updated as appropriate.       Prenatal Information:  Prenatal Results     POC Urine Glucose/Protein     Test Value Reference Range Date Time    Urine Glucose        Urine Protein              Initial Prenatal Labs     Test Value Reference Range Date Time    Hemoglobin  12.3 g/dL 12.0 - 15.9 21 021    Hematocrit  35.4 % 34.0 - 46.6 21 021    Platelets  208 10*3/mm3 140 - 450 21 021    Rubella IgG ^ Immune   21     Hepatitis B SAg ^ Negative   21     Hepatitis C Ab        RPR        ABO ^ A   21     Rh ^ Positive   21     Antibody Screen        HIV ^ Non-Reactive   21     Urine Culture        Gonorrhea        Chlamydia        TSH        HgB A1c               2nd and 3rd Trimester     Test Value Reference Range Date Time    Hemoglobin (repeated)  12.3 g/dL 12.0 - 15.9 215    Hematocrit (repeated)  35.4 % 34.0 - 46.6 21 0215    Platelets   208 10*3/mm3 140 - 450 21 0215    GCT        Antibody Screen (repeated)        GTT Fasting        GTT 1 Hr        GTT 2 Hr        GTT 3 Hr        Group B Strep ^ Positive   21           Drug Screening     Test Value Reference Range Date Time    Amphetamine Screen        Barbiturate Screen        Benzodiazepine Screen        Methadone Screen        Phencyclidine Screen        Opiates Screen        THC Screen        Cocaine Screen        Propoxyphene Screen        Buprenorphine Screen         Methamphetamine Screen        Oxycodone Screen        Tricyclic Antidepressants Screen              Other (Risk screening)     Test Value Reference Range Date Time    Varicella IgG        Parvovirus IgG        CMV IgG        Cystic Fibrosis        Hemoglobin electrophoresis        NIPT        MSAFP-4        AFP (for NTD only)              Legend    ^: Historical                      External Prenatal Results     Pregnancy Outside Results - Transcribed From Office Records - See Scanned Records For Details     Test Value Date Time    ABO ^ A  21     Rh ^ Positive  21     Antibody Screen       Varicella IgG       Rubella ^ Immune  21     Hgb  12.3 g/dL 21 0215    Hct  35.4 % 21 0215    Glucose Fasting GTT       Glucose Tolerance Test 1 hour       Glucose Tolerance Test 3 hour       Gonorrhea (discrete)       Chlamydia (discrete)       RPR       VDRL ^ NR  21     Syphilis Antibody       HBsAg ^ Negative  21     Herpes Simplex Virus PCR       Herpes Simplex VIrus Culture       HIV ^ Non-Reactive  21     Hep C RNA Quant PCR       Hep C Antibody       AFP       Group B Strep ^ Positive  21     GBS Susceptibility to Clindamycin       GBS Susceptibility to Erythromycin       Fetal Fibronectin       Genetic Testing, Maternal Blood             Drug Screening     Test Value Date Time    Urine Drug Screen       Amphetamine Screen       Barbiturate Screen       Benzodiazepine Screen       Methadone Screen       Phencyclidine Screen       Opiates Screen       THC Screen       Cocaine Screen       Propoxyphene Screen       Buprenorphine Screen       Methamphetamine Screen       Oxycodone Screen       Tricyclic Antidepressants Screen             Legend    ^: Historical                         Past OB History:     OB History    Para Term  AB Living   1 0 0 0 0 0   SAB IAB Ectopic Molar Multiple Live Births   0 0 0 0 0 0      # Outcome Date GA Lbr Mateusz/2nd Weight Sex  Delivery Anes PTL Lv   1 Current                Past Medical History: Past Medical History:   Diagnosis Date   • Depression       Past Surgical History No past surgical history on file.   Family History: No family history on file.   Social History:  reports that she has never smoked. She has never used smokeless tobacco.   reports previous alcohol use.   reports no history of drug use.        General ROS: Pertinent items are noted in HPI    Objective       Vital Signs Range for the last 24 hours  Temperature: Temp:  [98.5 °F (36.9 °C)] 98.5 °F (36.9 °C)   Temp Source: Temp src: Oral   BP: BP: (135)/(85) 135/85   Pulse: Heart Rate:  [79] 79   Respirations: Resp:  [16] 16   SPO2:     O2 Amount (l/min):     O2 Devices     Weight: Weight:  [98.4 kg (217 lb)] 98.4 kg (217 lb)     Physical Examination: General appearance - alert, well appearing, and in major distress.  Lungs:CTA tere  Heart:RRR  Abdomen:Soft ,NT/Gravid        Presentation: VTX   Cervix: Exam by: Method: sterile exam per RN   Dilation: Cervical Dilation (cm): 10   Effacement: Cervical Effacement: 100%   Station: +3       GBS is positive      Assessment/Plan       Labor and delivery, indication for care        Assessment:  1.  Intrauterine pregnancy at 37w3d gestation active labor.    2.  labor  without ROM  3.  Obstetrical history significant for is non-contributory.  4.  GBS status:   External Strep Group B Ag   Date Value Ref Range Status   11/04/2021 Positive  Final       Plan:  Patient had precipitous delivery- See delivery Note for details.      Tim Portillo MD  11/21/2021  02:54 EST

## 2021-11-21 NOTE — L&D DELIVERY NOTE
[facility]  Vaginal Delivery Note    Delivery     Delivery: Vaginal, Spontaneous     YOB: 2020    Time of Birth:  Gestational Age 2:30 AM   37w3d     Anesthesia: None     Delivering clinician: Tim Portillo MD   Forceps?   No   Vacuum? No    Shoulder dystocia present: No        Delivery narrative: Viable male infant Apgars 8 and 9 was delivered over midline episiotomy.  Infant's nose and mouth were bulb suction.  Cord was clamped and cut after 30 seconds.  Infant was placed on mom's chest.  Cord gas and cord blood were then obtained.  Placenta was then delivered vaginal traction.  Note there was some fresh clots attached to the placenta so I suspect a small partial abruption.  Midline episiotomy was repaired with 3-0 Vicryl suture in the usual fashion.  No other lacerations noted.  Infant and mom recovering well.  Infant    Findings: male  infant     Infant observations: Weight: 2930 g (6 lb 7.4 oz)   Length: 19.5  in  Observations/Comments:        Apgars: 8  @ 1 minute /    8  @ 5 minutes         Placenta, Cord, and Fluid    Placenta delivered  Spontaneous  at   11/21/2021  2:33 AM     Cord: 3 vessels  present.   Nuchal Cord?    Nuchal cord x1.   Cord blood obtained: Yes    Cord gases obtained:  No    Cord gas results: Venous:    pH, Cord Venous   Date Value Ref Range Status   11/21/2021 7.452 (H) 7.310 - 7.370 pH Units Final       Arterial:    pH, Cord Arterial   Date Value Ref Range Status   11/21/2021 7.45 (H) 7.21 - 7.31 pH Units Final        Repair    Episiotomy:  Midline episiotomy      Lacerations: No   Estimated Blood Loss: Est. Blood Loss (mL): 250 mL (Filed from Delivery Summary) (11/21/21 0230)           Complications  Partial abruption.    Disposition  Mother to Mother Baby/Postpartum  in stable condition currently.  Baby to remains with mom  in stable condition currently.      Tim Portillo MD  11/21/21  03:08 EST      
Alert-The patient is alert, awake and responds to voice. The patient is oriented to time, place, and person. The triage nurse is able to obtain subjective information.

## 2021-11-21 NOTE — LACTATION NOTE
Initial visit with dyad, this is baby #1, mom had ask for a breast pump prior to LC arrival this am, bedside RN set mom up, mom was about to pump 10cc using 24mm flange and fed to baby via syringe, mom will need pump for home use, LC will obtain RX and supply prior to D/C. Mom states baby has been very fussy at breast and hard to latch, staff and pt have tried nipple shield as well as on bare breast. Encouraged mom to attempt to fed at breast first, call out for assistance as need and then if needed pump and give any EBM that she might collect. Discussed attempting to feed baby every 2-3 hours, allowing unlimited access to breast with unlimited time feeding. Encouraged to do awake, skin to skin as much as possible. Discussed what to expect over the next few days as breastfeeding is established. LC encouraged mom to call for assistance as needed while in hospital.

## 2021-11-21 NOTE — PROGRESS NOTES
[facility]amesin  Vaginal Delivery Progress Note    Subjective   Postpartum Day 0/1: Vaginal Delivery    The patient feels well.  Her pain is well controlled with nonsteroidal anti-inflammatory drugs and Tylenol.   She is ambulating well.  Patient describes her bleeding as moderate lochia.    Breastfeeding: Yes    Objective     Vital Signs Range for the last 24 hours  Temperature: Temp:  [97.9 °F (36.6 °C)-98.5 °F (36.9 °C)] 97.9 °F (36.6 °C)   Temp Source: Temp src: Oral   BP: BP: (101-148)/(69-90) 138/82   Pulse: Heart Rate:  [] 62   Respirations: Resp:  [16-18] 18   SPO2:     O2 Amount (l/min):     O2 Devices     Weight: Weight:  [98.4 kg (217 lb)] 98.4 kg (217 lb)     Admit Height:         Physical Exam:  General:  no acute distress.  Abdomen: abdomen is soft without significant tenderness, masses, organomegaly or guarding. Fundus: appropriate, firm, non tender  Extremities: normal, atraumatic, no cyanosis, and trace edema.       Lab results reviewed:  Yes   Rubella:  No results found for: RUBELLAIGGIN Nurse Transcribed from prenatal record --    External Rubella Qual   Date Value Ref Range Status   05/24/2021 Immune  Final     Rh Status:    RH type   Date Value Ref Range Status   11/21/2021 Positive  Final     Immunizations: There is no immunization history for the selected administration types on file for this patient.    Assessment/Plan       * No active hospital problems. *      Nayla Rodriguez is Day 0/1  post-partum  Vaginal, Spontaneous   .      Plan:  Continue current care.      Tim Portillo MD  11/21/2021  09:54 EST

## 2021-11-21 NOTE — OBED NOTES
Obstetrical Non-stress Test Interpretation     Name:  Nayla Rodriguez  MRN: [id number]    21 y.o. female  at 37w3d    Indication: Patient presented for evaluation of contractions.      Baseline: Normal 110-160 bpm  Variability:   Moderate/Normal (amplitude 6-25 bpm)  Accelerations: Present (32 weeks+) 15 x 15 bpm  Decelerations: Absent   Contractions:  Present       Impression:  Category I .  Latent labor.  P) D/C home       Labor precautions       Strict FCK      Tim Portillo MD  2021  15:50 EST

## 2021-11-21 NOTE — SIGNIFICANT NOTE
"   11/21/21 0020   Nonstress Test   Reason for NST Other (Comment)  (contractions)   Acoustic Stimulator No   Uterine Irritability No   Contractions Irregular   Contraction Frequency (Minutes) occassional   Fetal Assessment   Fetal Movement active   Fetal HR Assessment Method external   Fetal HR (beats/min) 120   Fetal Heart Baseline Rate normal range   Fetal HR Variability moderate (amplitude range 6 to 25 bpm)   Fetal HR Accelerations greater than/equal to 15 bpm; lasting at least 15 seconds   Fetal HR Decelerations absent   Fetal HR Tracing Category Category I   Sinusoidal Pattern Present absent   Reason for test: (P) Other (Comment) (contractions)  Date of Test: 11/21/2021  Time frame of test: 11/20/21 2335 thru 11/21/21 0025  RN NST Interpretation:    /85 (BP Location: Right arm, Patient Position: Sitting)   Pulse 79   Temp 98.5 °F (36.9 °C) (Oral)   Resp 16   Ht 177.8 cm (70\")   Wt 98.4 kg (217 lb)   LMP 03/04/2021 (Exact Date)   BMI 31.14 kg/m²   37w3d    "

## 2021-11-21 NOTE — NURSING NOTE
Pt came into triage complaining of contractions for the past two hours. Pt visible coping with no nonverbal signs of pain noted. Placed on monitor, occasional mild contractions w/reactive NST. Cervical exam the same as when in office per patient's report of exam on this past Thursday. Labor precautions given verbally and educational clinical reference. Pt and spouse agrees with current plan to d/c home per provider order w/labor precautions and return if s/s of active labor present.

## 2021-11-22 VITALS
TEMPERATURE: 98.6 F | RESPIRATION RATE: 16 BRPM | DIASTOLIC BLOOD PRESSURE: 79 MMHG | HEART RATE: 83 BPM | SYSTOLIC BLOOD PRESSURE: 129 MMHG

## 2021-11-22 RX ORDER — HYDROCODONE BITARTRATE AND ACETAMINOPHEN 5; 325 MG/1; MG/1
1 TABLET ORAL EVERY 6 HOURS PRN
Qty: 6 TABLET | Refills: 0 | Status: SHIPPED | OUTPATIENT
Start: 2021-11-22 | End: 2021-11-28

## 2021-11-22 RX ORDER — IBUPROFEN 600 MG/1
600 TABLET ORAL EVERY 6 HOURS
Qty: 30 TABLET | Refills: 0 | Status: SHIPPED | OUTPATIENT
Start: 2021-11-22 | End: 2022-03-16

## 2021-11-22 RX ADMIN — DOCUSATE SODIUM 100 MG: 100 CAPSULE, LIQUID FILLED ORAL at 08:06

## 2021-11-22 RX ADMIN — HYDROCODONE BITARTRATE AND ACETAMINOPHEN 1 TABLET: 5; 325 TABLET ORAL at 08:06

## 2021-11-22 RX ADMIN — IBUPROFEN 600 MG: 600 TABLET ORAL at 14:57

## 2021-11-22 RX ADMIN — IBUPROFEN 600 MG: 600 TABLET ORAL at 08:06

## 2021-11-22 RX ADMIN — IBUPROFEN 600 MG: 600 TABLET ORAL at 03:01

## 2021-11-22 NOTE — PLAN OF CARE
Problem: Adult Inpatient Plan of Care  Goal: Plan of Care Review  Outcome: Met  Goal: Patient-Specific Goal (Individualized)  Outcome: Met  Goal: Absence of Hospital-Acquired Illness or Injury  Outcome: Met  Intervention: Identify and Manage Fall Risk  Recent Flowsheet Documentation  Taken 11/22/2021 0830 by Xiao Roe, RN  Safety Promotion/Fall Prevention: safety round/check completed  Intervention: Prevent Skin Injury  Recent Flowsheet Documentation  Taken 11/22/2021 0830 by Xiao Roe, RN  Body Position: supine  Goal: Optimal Comfort and Wellbeing  Outcome: Met  Goal: Readiness for Transition of Care  Outcome: Met   Goal Outcome Evaluation:

## 2021-11-22 NOTE — LACTATION NOTE
Mom states breast feeding is going well, baby is latching some with and without shield, mom is pumping some and giving EBM. Home breastpump provided and instructions on use, cleaning, milk storage gone over. D/C instructions gone over, included hand hygiene, respiratory hygiene and breastfeeding when mom is sick, LC encouraged mom to see pediatrician two days from discharge for follow up.  LC discussed  breastfeeding behaviors, first two weeks of breastfeeding expectations, encouraged her to breastfeed/pump frequently for good milk supply. LC discussed nipple care, plugged ducts, engorgement, and breast infection. LC informed mom that LC was available after D/C for assistance with breastfeeding.

## 2021-11-22 NOTE — DISCHARGE SUMMARY
[facility]  Delivery Discharge Summary    Primary OB Clinician:     EDC: Estimated Date of Delivery: 21    Gestational Age:37w3d    Antepartum complications: none    Date of Delivery: 2020   Time of Delivery: 2:30 AM     Delivered By:  Tim Portillo     Delivery Type: Vaginal, Spontaneous      Tubal Ligation: n/a    Baby:male  infant;   Apgar:  8  @ 1 minute /   Apgar:  8  @ 5 minutes   Weight: 2930 g (6 lb 7.4 oz)    Length: 19.5     Anesthesia: None      Intrapartum complications: None    Laceration: No    Episiotomy: Yes     Placenta: Spontaneous     Feeding method: Breastfeeding Status: Yes    Rh Immune globulin given: not applicable    Rubella vaccine given: not applicable    Discharge Date: 2021; Discharge Time: 13:18 EST        Plan:      Follow-up appointment with Dr. Chappell in 2 weeks.     Electronically signed by Desi Chappell MD, 21, 1:18 PM EST.

## 2021-11-22 NOTE — PLAN OF CARE
Problem: Adult Inpatient Plan of Care  Goal: Plan of Care Review  Outcome: Ongoing, Progressing  Goal: Patient-Specific Goal (Individualized)  Outcome: Ongoing, Progressing  Goal: Absence of Hospital-Acquired Illness or Injury  Outcome: Ongoing, Progressing  Intervention: Identify and Manage Fall Risk  Recent Flowsheet Documentation  Taken 11/22/2021 0600 by Prakash Almazan RN  Safety Promotion/Fall Prevention: safety round/check completed  Taken 11/22/2021 0301 by Prakash Almazan RN  Safety Promotion/Fall Prevention: safety round/check completed  Taken 11/22/2021 0100 by Prakash Almazan RN  Safety Promotion/Fall Prevention: safety round/check completed  Taken 11/21/2021 2000 by Prakash Almazan RN  Safety Promotion/Fall Prevention: safety round/check completed  Intervention: Prevent Skin Injury  Recent Flowsheet Documentation  Taken 11/21/2021 2000 by Prakash Almazan RN  Body Position: position changed independently  Goal: Optimal Comfort and Wellbeing  Outcome: Ongoing, Progressing  Goal: Readiness for Transition of Care  Outcome: Ongoing, Progressing     Problem: Bleeding (Labor)  Goal: Hemostasis  Outcome: Ongoing, Progressing     Problem: Change in Fetal Wellbeing (Labor)  Goal: Stable Fetal Wellbeing  Outcome: Ongoing, Progressing  Intervention: Promote and Monitor Fetal Wellbeing  Recent Flowsheet Documentation  Taken 11/21/2021 2000 by Prakash Almazan RN  Body Position: position changed independently     Problem: Delayed Labor Progression (Labor)  Goal: Effective Progression to Delivery  Outcome: Ongoing, Progressing     Problem: Infection (Labor)  Goal: Absence of Infection Signs and Symptoms  Outcome: Ongoing, Progressing     Problem: Labor Pain (Labor)  Goal: Acceptable Pain Control  Outcome: Ongoing, Progressing     Problem: Uterine Tachysystole (Labor)  Goal: Normal Uterine Contraction Pattern  Outcome: Ongoing, Progressing     Problem: Breastfeeding  Goal: Effective Breastfeeding  Outcome:  Ongoing, Progressing   Goal Outcome Evaluation:

## 2021-11-24 LAB
CYTO UR: NORMAL
LAB AP CASE REPORT: NORMAL
LAB AP CLINICAL INFORMATION: NORMAL
PATH REPORT.FINAL DX SPEC: NORMAL
PATH REPORT.GROSS SPEC: NORMAL

## 2022-01-12 ENCOUNTER — OFFICE VISIT (OUTPATIENT)
Dept: FAMILY MEDICINE CLINIC | Facility: CLINIC | Age: 22
End: 2022-01-12

## 2022-01-12 VITALS
OXYGEN SATURATION: 97 % | BODY MASS INDEX: 27.73 KG/M2 | HEIGHT: 72 IN | TEMPERATURE: 98.2 F | DIASTOLIC BLOOD PRESSURE: 72 MMHG | HEART RATE: 84 BPM | WEIGHT: 204.7 LBS | SYSTOLIC BLOOD PRESSURE: 118 MMHG

## 2022-01-12 DIAGNOSIS — F41.9 ANXIETY: Primary | ICD-10-CM

## 2022-01-12 DIAGNOSIS — F33.1 MODERATE EPISODE OF RECURRENT MAJOR DEPRESSIVE DISORDER: ICD-10-CM

## 2022-01-12 PROBLEM — M67.52 PLICA OF KNEE, LEFT: Status: RESOLVED | Noted: 2017-01-06 | Resolved: 2022-01-12

## 2022-01-12 PROBLEM — M67.52 PLICA OF KNEE, LEFT: Status: ACTIVE | Noted: 2017-01-06

## 2022-01-12 PROBLEM — Z34.90 PREGNANCY: Status: RESOLVED | Noted: 2021-06-14 | Resolved: 2022-01-12

## 2022-01-12 PROBLEM — R10.9 ABDOMINAL CRAMPING: Status: RESOLVED | Noted: 2021-10-29 | Resolved: 2022-01-12

## 2022-01-12 PROBLEM — M25.569 PAIN IN JOINT, LOWER LEG: Status: ACTIVE | Noted: 2022-01-12

## 2022-01-12 PROBLEM — R30.0 DYSURIA: Status: RESOLVED | Noted: 2021-06-14 | Resolved: 2022-01-12

## 2022-01-12 PROBLEM — J30.2 SEASONAL ALLERGIC RHINITIS: Status: ACTIVE | Noted: 2022-01-12

## 2022-01-12 PROBLEM — M25.569 PAIN IN JOINT, LOWER LEG: Status: RESOLVED | Noted: 2022-01-12 | Resolved: 2022-01-12

## 2022-01-12 PROBLEM — O21.9 VOMITING AS REASON FOR CARE IN PREGNANCY: Status: RESOLVED | Noted: 2021-06-14 | Resolved: 2022-01-12

## 2022-01-12 PROCEDURE — 99203 OFFICE O/P NEW LOW 30 MIN: CPT

## 2022-01-12 RX ORDER — HYDROXYZINE HYDROCHLORIDE 25 MG/1
25 TABLET, FILM COATED ORAL 3 TIMES DAILY PRN
Qty: 90 TABLET | Refills: 1 | Status: SHIPPED | OUTPATIENT
Start: 2022-01-12 | End: 2022-04-13

## 2022-01-12 RX ORDER — IBUPROFEN 400 MG/1
400 TABLET ORAL
COMMUNITY
End: 2022-03-16

## 2022-01-12 RX ORDER — BUPROPION HYDROCHLORIDE 150 MG/1
150 TABLET, EXTENDED RELEASE ORAL 2 TIMES DAILY
Qty: 60 TABLET | Refills: 0 | Status: SHIPPED | OUTPATIENT
Start: 2022-01-12 | End: 2022-02-22

## 2022-01-12 NOTE — PROGRESS NOTES
Nayla Rodriguez presents to [facility] with complaints of worsening depression and anxiety, and to establish as a new patient.      History of Present Illness  This is a 21-year-old female, no significant past medical history, who presents to the clinic with complaints of worsening depression and anxiety.  She is also here to establish as a new patient.    Of note, she recently gave birth approximately 1 month ago to a healthy baby boy.  She is not breast-feeding.  She states that she does not wish to have anymore children.    Patient states that she has been battling depression for most of her life, was actually diagnosed with this at age 15, she did try to commit suicide around that age.  She was started on Zoloft and Prozac at that time, and stated that it did not help but she thought that it may have even made it worse.  She states that her depression did worsen prior to pregnancy, and is still present.  She states that she feels really low and there are a few things that give her pleasure.  She denies any suicidal thoughts at this time, but would like to try something else to help with this.  She has tried therapy in the past, several rounds, and did not feel it helped but actually felt like the therapist were judging her.  She states she rarely has panic attacks, but did have one last week.  She states her mother has severe anxiety and takes Xanax for this.  She has not tried anything for her anxiety.  She also reports that she does not sleep well at night and is wondering if there are some to help with all of these issues.    Past Medical History:   Diagnosis Date   • Anxiety    • Contusion 5/5/2015   • Depression    • Pregnancy      Past Surgical History:   • KNEE ARTHROSCOPY W/ MENISCECTOMY   • TONSILLECTOMY       Social History     Socioeconomic History   • Marital status: Single   Tobacco Use   • Smoking status: Never Smoker   • Smokeless tobacco: Never Used   Vaping  "Use   • Vaping Use: Never used   Substance and Sexual Activity   • Alcohol use: Not Currently   • Drug use: Never   • Sexual activity: Defer     Socioeconomic History   • Marital status: Single   Tobacco Use   • Smoking status: Never Smoker   • Smokeless tobacco: Never Used   Vaping Use   • Vaping Use: Never used   Substance and Sexual Activity   • Alcohol use: Not Currently   • Drug use: Never   • Sexual activity: Defer      Problem Relation Age of Onset   • Diabetes Mother          Objective   Vital Signs:   /72   Pulse 84   Temp 98.2 °F (36.8 °C)   Ht 182.9 cm (72\")   Wt 92.9 kg (204 lb 11.2 oz)   SpO2 97%   BMI 27.76 kg/m²     Body mass index is 27.76 kg/m².    All labs, imaging, test results, and specialty provider notes reviewed with patient.       Physical Exam  Vitals reviewed.   Constitutional:       Appearance: Normal appearance.   Cardiovascular:      Rate and Rhythm: Normal rate and regular rhythm.      Pulses: Normal pulses.      Heart sounds: Normal heart sounds.   Pulmonary:      Effort: Pulmonary effort is normal.      Breath sounds: Normal breath sounds.   Neurological:      General: No focal deficit present.      Mental Status: She is alert and oriented to person, place, and time.   Psychiatric:         Attention and Perception: Attention normal.         Mood and Affect: Mood and affect normal.         Speech: Speech normal.         Behavior: Behavior normal.         Thought Content: Thought content normal.          Assessment and Plan:  Diagnoses and all orders for this visit:    1. Anxiety (Primary)  Comments:  We will give her hydroxyzine to use during panic attacks and to assist with sleep. Also start wellbutrin.  Orders:  -     buPROPion SR (Wellbutrin SR) 150 MG 12 hr tablet; Take 1 tablet by mouth 2 (Two) Times a Day.  Dispense: 60 tablet; Refill: 0  -     hydrOXYzine (ATARAX) 25 MG tablet; Take 1 tablet by mouth 3 (Three) Times a Day As Needed for Anxiety.  Dispense: 90 tablet; " Refill: 1    2. Moderate episode of recurrent major depressive disorder (HCC)  Comments:  We will start patient on wellbutrin. Will see patient back in 1 month, may need increased dose. Will consider GeneSight swab if ineffective.  Orders:  -     buPROPion SR (Wellbutrin SR) 150 MG 12 hr tablet; Take 1 tablet by mouth 2 (Two) Times a Day.  Dispense: 60 tablet; Refill: 0  -     hydrOXYzine (ATARAX) 25 MG tablet; Take 1 tablet by mouth 3 (Three) Times a Day As Needed for Anxiety.  Dispense: 90 tablet; Refill: 1      We will discuss preventative screenings/immunizations at next visit.  Patient had Pap smear performed approximately 1 year ago, it was normal at that time.    Follow Up:  Return in about 4 weeks (around 2/9/2022).    Patient was given instructions and counseling regarding her condition or for health maintenance advice. Please see specific information pulled into the AVS if appropriate.

## 2022-01-19 ENCOUNTER — TELEPHONE (OUTPATIENT)
Dept: URGENT CARE | Facility: CLINIC | Age: 22
End: 2022-01-19

## 2022-01-19 PROCEDURE — U0004 COV-19 TEST NON-CDC HGH THRU: HCPCS | Performed by: NURSE PRACTITIONER

## 2022-01-19 NOTE — TELEPHONE ENCOUNTER
Patient was contacted via phone at [phone].  Patient identifiers were confirmed.  Patient was made aware of her positive COVID-19 test results, and she had no further questions.

## 2022-02-22 ENCOUNTER — OFFICE VISIT (OUTPATIENT)
Dept: FAMILY MEDICINE CLINIC | Facility: CLINIC | Age: 22
End: 2022-02-22

## 2022-02-22 VITALS
RESPIRATION RATE: 16 BRPM | TEMPERATURE: 97.6 F | OXYGEN SATURATION: 98 % | SYSTOLIC BLOOD PRESSURE: 122 MMHG | HEART RATE: 69 BPM | DIASTOLIC BLOOD PRESSURE: 86 MMHG | WEIGHT: 212.3 LBS | HEIGHT: 72 IN | BODY MASS INDEX: 28.76 KG/M2

## 2022-02-22 DIAGNOSIS — N30.01 ACUTE CYSTITIS WITH HEMATURIA: ICD-10-CM

## 2022-02-22 DIAGNOSIS — R30.0 BURNING WITH URINATION: Primary | ICD-10-CM

## 2022-02-22 DIAGNOSIS — F41.9 ANXIETY: ICD-10-CM

## 2022-02-22 LAB
BILIRUB BLD-MCNC: NEGATIVE MG/DL
CLARITY, POC: ABNORMAL
COLOR UR: ABNORMAL
EXPIRATION DATE: ABNORMAL
GLUCOSE UR STRIP-MCNC: ABNORMAL MG/DL
KETONES UR QL: NEGATIVE
LEUKOCYTE EST, POC: ABNORMAL
Lab: ABNORMAL
NITRITE UR-MCNC: POSITIVE MG/ML
PH UR: 6.5 [PH] (ref 5–8)
PROT UR STRIP-MCNC: ABNORMAL MG/DL
RBC # UR STRIP: ABNORMAL /UL
SP GR UR: 1.02 (ref 1–1.03)
UROBILINOGEN UR QL: NORMAL

## 2022-02-22 PROCEDURE — 96372 THER/PROPH/DIAG INJ SC/IM: CPT

## 2022-02-22 PROCEDURE — 99213 OFFICE O/P EST LOW 20 MIN: CPT

## 2022-02-22 PROCEDURE — 87086 URINE CULTURE/COLONY COUNT: CPT

## 2022-02-22 RX ORDER — PHENAZOPYRIDINE HYDROCHLORIDE 200 MG/1
200 TABLET, FILM COATED ORAL 3 TIMES DAILY PRN
Qty: 42 TABLET | Refills: 0 | Status: SHIPPED | OUTPATIENT
Start: 2022-02-22 | End: 2022-03-16

## 2022-02-22 RX ORDER — ESCITALOPRAM OXALATE 5 MG/1
5 TABLET ORAL DAILY
Qty: 30 TABLET | Refills: 1 | Status: SHIPPED | OUTPATIENT
Start: 2022-02-22 | End: 2022-03-16

## 2022-02-22 RX ORDER — NITROFURANTOIN 25; 75 MG/1; MG/1
100 CAPSULE ORAL 2 TIMES DAILY
Qty: 14 CAPSULE | Refills: 0 | Status: SHIPPED | OUTPATIENT
Start: 2022-02-22 | End: 2022-03-16

## 2022-02-24 LAB — BACTERIA SPEC AEROBE CULT: NO GROWTH

## 2022-03-16 ENCOUNTER — OFFICE VISIT (OUTPATIENT)
Dept: FAMILY MEDICINE CLINIC | Facility: CLINIC | Age: 22
End: 2022-03-16

## 2022-03-16 VITALS
HEIGHT: 72 IN | WEIGHT: 216.5 LBS | HEART RATE: 88 BPM | RESPIRATION RATE: 18 BRPM | OXYGEN SATURATION: 98 % | DIASTOLIC BLOOD PRESSURE: 78 MMHG | TEMPERATURE: 97.6 F | BODY MASS INDEX: 29.32 KG/M2 | SYSTOLIC BLOOD PRESSURE: 126 MMHG

## 2022-03-16 DIAGNOSIS — F41.9 ANXIETY: Primary | ICD-10-CM

## 2022-03-16 DIAGNOSIS — F33.41 RECURRENT MAJOR DEPRESSIVE DISORDER, IN PARTIAL REMISSION: ICD-10-CM

## 2022-03-16 PROCEDURE — 99213 OFFICE O/P EST LOW 20 MIN: CPT

## 2022-03-16 RX ORDER — ESCITALOPRAM OXALATE 10 MG/1
10 TABLET ORAL DAILY
Qty: 90 TABLET | Refills: 1 | Status: SHIPPED | OUTPATIENT
Start: 2022-03-16 | End: 2022-05-09

## 2022-03-16 NOTE — PROGRESS NOTES
"Nayla Rodriguez presents to [facility] who presents to the clinic to discuss having a emotional support animal in her apartment and also to follow-up on her new medication start.      History of Present Illness  This is a 21-year-old female who presents to the clinic to discuss having an emotional support animal in her apartment as well as also to follow-up on her mood medication that she just started.    Patient states that she has a dog that provides her letter comfort emotionally and mentally that she needs paperwork for returning to her landlord in order for the dog to be in their apartment.  Patient states that the dog does help her, to help with her severe anxiety as well as her depression.  Patient states she struggles, especially at night, she does not have this emotional support animal with her.  Patient has recently been started on a new medication, Lexapro, states that it works pretty well, but is needing an increased dose of it as well.  Patient has had no side effects from this medication.    The following portions of the patient's history were personally reviewed and updated as appropriate: allergies, current medications, past medical history, past surgical history, past family history, and past social history.       Objective   Vital Signs:   /78   Pulse 88   Temp 97.6 °F (36.4 °C)   Resp 18   Ht 182.9 cm (72\")   Wt 98.2 kg (216 lb 8 oz)   SpO2 98%   BMI 29.36 kg/m²     Body mass index is 29.36 kg/m².    All labs, imaging, test results, and specialty provider notes reviewed with patient.     Physical Exam  Vitals reviewed.   Constitutional:       Appearance: Normal appearance.   Cardiovascular:      Rate and Rhythm: Normal rate and regular rhythm.      Pulses: Normal pulses.      Heart sounds: Normal heart sounds.   Pulmonary:      Effort: Pulmonary effort is normal.      Breath sounds: Normal breath sounds.   Neurological:      General: No focal deficit " present.      Mental Status: She is alert and oriented to person, place, and time.          Assessment and Plan:  Diagnoses and all orders for this visit:    1. Anxiety (Primary)  -     escitalopram (Lexapro) 10 MG tablet; Take 1 tablet by mouth Daily.  Dispense: 90 tablet; Refill: 1    2. Recurrent major depressive disorder, in partial remission (HCC)  -     escitalopram (Lexapro) 10 MG tablet; Take 1 tablet by mouth Daily.  Dispense: 90 tablet; Refill: 1      We will increase patient's Lexapro to 10 mg from 5 mg as patient states that she needs an increased dose of this.  We will also send off the [employer] testing to see if there is any other medications that she would also benefit from, and to see if there is any that interacts with her genetics.  Did discuss that this will take a few weeks to come back, patient verbalized understanding.  We will continue the hydroxyzine and Lexapro at this time.  Follow Up:  No follow-ups on file.    Patient was given instructions and counseling regarding her condition or for health maintenance advice. Please see specific information pulled into the AVS if appropriate.

## 2022-03-24 ENCOUNTER — TELEPHONE (OUTPATIENT)
Dept: FAMILY MEDICINE CLINIC | Facility: CLINIC | Age: 22
End: 2022-03-24

## 2022-03-24 ENCOUNTER — APPOINTMENT (OUTPATIENT)
Dept: GENERAL RADIOLOGY | Facility: HOSPITAL | Age: 22
End: 2022-03-24

## 2022-03-24 VITALS
OXYGEN SATURATION: 100 % | RESPIRATION RATE: 16 BRPM | SYSTOLIC BLOOD PRESSURE: 131 MMHG | WEIGHT: 217.81 LBS | TEMPERATURE: 97.9 F | BODY MASS INDEX: 29.5 KG/M2 | HEART RATE: 79 BPM | DIASTOLIC BLOOD PRESSURE: 90 MMHG | HEIGHT: 72 IN

## 2022-03-24 PROCEDURE — 99283 EMERGENCY DEPT VISIT LOW MDM: CPT

## 2022-03-24 PROCEDURE — 73110 X-RAY EXAM OF WRIST: CPT

## 2022-03-24 NOTE — TELEPHONE ENCOUNTER
Patient called into office asking if we have received a letter from her landlord regarding her emotional support animal.     [facility] to inform patient that we have not received that letter.

## 2022-03-25 ENCOUNTER — TELEPHONE (OUTPATIENT)
Dept: ORTHOPEDIC SURGERY | Facility: CLINIC | Age: 22
End: 2022-03-25

## 2022-03-25 ENCOUNTER — HOSPITAL ENCOUNTER (EMERGENCY)
Facility: HOSPITAL | Age: 22
Discharge: HOME | End: 2022-03-25
Attending: EMERGENCY MEDICINE | Admitting: EMERGENCY MEDICINE

## 2022-03-25 DIAGNOSIS — S63.502A SPRAIN OF LEFT WRIST, INITIAL ENCOUNTER: Primary | ICD-10-CM

## 2022-03-25 RX ORDER — IBUPROFEN 800 MG/1
800 TABLET ORAL EVERY 8 HOURS PRN
Qty: 20 TABLET | Refills: 0 | Status: SHIPPED | OUTPATIENT
Start: 2022-03-25 | End: 2022-12-07 | Stop reason: SDUPTHER

## 2022-03-25 RX ORDER — HYDROCODONE BITARTRATE AND ACETAMINOPHEN 5; 325 MG/1; MG/1
1 TABLET ORAL EVERY 6 HOURS PRN
Status: DISCONTINUED | OUTPATIENT
Start: 2022-03-25 | End: 2022-03-25 | Stop reason: HOSPADM

## 2022-03-25 RX ADMIN — HYDROCODONE BITARTRATE AND ACETAMINOPHEN 1 TABLET: 5; 325 TABLET ORAL at 00:51

## 2022-03-25 NOTE — DISCHARGE INSTRUCTIONS
X-ray was negative for any fracture or dislocation.    Rest.  Ice.  Elevate.  Wear splint for comfort until better.    Tylenol Motrin for pain.    Follow-up with PCP if no better

## 2022-03-25 NOTE — ED PROVIDER NOTES
Time: 00:52 EDT  Arrived by: Private vehicle  Chief Complaint: Wrist pain  History provided by: Patient  History is limited by: N/A    History of Present Illness:  Patient is a 21 y.o. year old female that presents to the emergency department with wrist pain and injury      History provided by:  Patient  Wrist Injury  Location:  Wrist  Wrist location:  L wrist  Injury: yes    Time since incident:  4 hours  Mechanism of injury: fall    Mechanism of injury comment:  Patient was riding her hover board and fell hurting her wrist  Fall:     Fall occurred: From standing on a hover board.    Impact surface:  Hard floor    Point of impact:  Unable to specify    Entrapped after fall: no    Pain details:     Quality:  Aching and throbbing    Radiates to:  Does not radiate    Severity:  Severe    Onset quality:  Sudden    Duration:  4 hours    Timing:  Constant    Progression:  Unchanged  Handedness:  Right-handed  Dislocation: no    Foreign body present:  No foreign bodies  Tetanus status:  Up to date  Prior injury to area:  No  Relieved by:  Being still and rest  Worsened by:  Movement (Touch)  Ineffective treatments:  Being still and rest  Associated symptoms: decreased range of motion ( Due to pain) and swelling    Associated symptoms: no fever, no muscle weakness, no numbness, no stiffness and no tingling    Risk factors: no frequent fractures    Fall  Associated symptoms: no abdominal pain and no chest pain        Similar Symptoms Previously: No    Recently seen: No      Patient Care Team  Primary Care Provider: Jason    Past Medical History:     No Known Allergies  Past Medical History:   Diagnosis Date   • Anxiety    • Contusion 5/5/2015   • Depression    • Pregnancy      Past Surgical History:   Procedure Laterality Date   • KNEE ARTHROSCOPY W/ MENISCECTOMY Left    • TONSILLECTOMY       Family History   Problem Relation Age of Onset   • Diabetes Mother        Home Medications:  Prior to Admission medications   "  Medication Sig Start Date End Date Taking? Authorizing Provider   Chlorcyclizine-Pseudoephed (Stahist AD) 25-60 MG tablet Take 1 tablet by mouth 3 (Three) Times a Day As Needed (Congestion). 1/19/22   Celsa Hsieh APRN   escitalopram (Lexapro) 10 MG tablet Take 1 tablet by mouth Daily. 3/16/22   Bridgett Chin APRN   hydrOXYzine (ATARAX) 25 MG tablet Take 1 tablet by mouth 3 (Three) Times a Day As Needed for Anxiety. 1/12/22   Bridgett Chin APRN        Social History:   PT  reports that she has never smoked. She has never used smokeless tobacco. She reports previous alcohol use. She reports that she does not use drugs.    Record Review:  I have reviewed the patient's records in [facility].     Review of Systems  Review of Systems   Constitutional: Negative for fever.   Cardiovascular: Negative for chest pain.   Gastrointestinal: Negative for abdominal pain.   Genitourinary: Negative for flank pain.   Musculoskeletal: Positive for arthralgias ( Left wrist) and joint swelling ( Left wrist). Negative for stiffness.   Skin: Negative.    Neurological: Negative for weakness and numbness.   Hematological: Negative.    Psychiatric/Behavioral: Negative.         Physical Exam  /90 (Patient Position: Sitting)   Pulse 79   Temp 97.9 °F (36.6 °C) (Oral)   Resp 16   Ht 182.9 cm (72\")   Wt 98.8 kg (217 lb 13 oz)   SpO2 100%   Breastfeeding No   BMI 29.54 kg/m²     Physical Exam  Vitals and nursing note reviewed.   Constitutional:       General: She is not in acute distress.     Appearance: Normal appearance. She is not toxic-appearing.   HENT:      Head: Atraumatic.   Eyes:      General: No scleral icterus.  Cardiovascular:      Pulses: Normal pulses.   Pulmonary:      Effort: Pulmonary effort is normal. No respiratory distress.   Abdominal:      Tenderness: There is no abdominal tenderness.   Musculoskeletal:         General: Swelling ( Small amount of swelling to left wrist) and tenderness ( Left " "wrist) present. No deformity.      Cervical back: Normal range of motion.      Comments: Painful range of motion of left wrist   Skin:     General: Skin is warm and dry.      Capillary Refill: Capillary refill takes less than 2 seconds.   Neurological:      Mental Status: She is alert and oriented to person, place, and time.      Sensory: No sensory deficit.      Motor: No weakness.   Psychiatric:         Mood and Affect: Mood normal.         Behavior: Behavior normal.         Thought Content: Thought content normal.         Judgment: Judgment normal.                  ED Course  /90 (Patient Position: Sitting)   Pulse 79   Temp 97.9 °F (36.6 °C) (Oral)   Resp 16   Ht 182.9 cm (72\")   Wt 98.8 kg (217 lb 13 oz)   SpO2 100%   Breastfeeding No   BMI 29.54 kg/m²   Results for orders placed or performed in visit on 02/22/22   Urine Culture - Urine, Urine, Clean Catch    Specimen: Urine, Clean Catch   Result Value Ref Range    Urine Culture No growth    POCT urinalysis dipstick, automated    Specimen: Urine   Result Value Ref Range    Color Orange (A) Yellow, Straw, Dark Yellow, Roxy    Clarity, UA Cloudy (A) Clear    Specific Gravity  1.025 1.005 - 1.030    pH, Urine 6.5 5.0 - 8.0    Leukocytes Small (1+) (A) Negative    Nitrite, UA Positive (A) Negative    Protein, POC 30 mg/dL (A) Negative mg/dL    Glucose,  mg/dL (A) Negative, 1000 mg/dL (3+) mg/dL    Ketones, UA Negative Negative    Urobilinogen, UA Normal Normal    Bilirubin Negative Negative    Blood, UA Trace (A) Negative    Lot Number [id number]     Expiration Date 2-      Medications   HYDROcodone-acetaminophen (NORCO) 5-325 MG per tablet 1 tablet (1 tablet Oral Given 3/25/22 0051)     XR Wrist 3+ View Left    Result Date: 3/25/2022  Narrative: PROCEDURE: XR WRIST 3+ VW LEFT  [facility] WRIST 2V AP AND LAT LT, 11/22/2019, 14:42.  INDICATIONS: fall; left wrist pain  FINDINGS: 3 views were obtained.  No acute " fracture or acute malalignment is identified.  If symptoms or clinical concerns persist, consider imaging follow-up.      Impression:  No acute fracture or acute malalignment is identified.      DANIELITO LAW JR, MD       Electronically Signed and Approved By: DANIELITO LAW JR, MD on 3/25/2022 at 0:15               Medical Decision Making:                     MDM  Number of Diagnoses or Management Options  Sprain of left wrist, initial encounter  Diagnosis management comments: I have spoken with the patient. I have explained the patient´s condition, diagnoses and treatment plan based on the information available to me at this time. I have answered the patient's questions and addressed any concerns. The patient has a good  understanding of the patient´s diagnosis, condition, and treatment plan as can be expected at this point. The vital signs have been stable. The patient´s condition is stable and appropriate for discharge from the emergency department.      The patient will pursue further outpatient evaluation with the primary care physician or other designated or consulting physician as outlined in the discharge instructions. They are agreeable to this plan of care and follow-up instructions have been explained in detail. The patient has received these instructions in written format and have expressed an understanding of the discharge instructions. The patient is aware that any significant change in condition or worsening of symptoms should prompt an immediate return to this or the closest emergency department or call to 911.         Amount and/or Complexity of Data Reviewed  Tests in the radiology section of CPT®: reviewed and ordered  Tests in the medicine section of CPT®: ordered and reviewed    Risk of Complications, Morbidity, and/or Mortality  Presenting problems: low  Diagnostic procedures: low  Management options: low    Patient Progress  Patient progress: stable       Final diagnoses:   Sprain of left  wrist, initial encounter        Disposition:  ED Disposition     ED Disposition   Discharge    Condition   Stable    Comment   --              Alecia Bose, APRN  03/25/22 0052

## 2022-03-25 NOTE — TELEPHONE ENCOUNTER
Caller: PATIENT     Relationship to patient:  SELF     Best call back number: [phone]    Chief complaint: SPRAIN OF LEFT WRIST     Type of visit: WORK IN     Requested date: TODAY      Additional notes: PT. WENT TO [facility] ER ON 03/24/22 FOR SPRAIN OF LEFT WRIST- SEE NOTES AND XRAY REPORT IN CHART.   SHE IS ESTABLISHED PT. OF DR. BROWN.   I OFFERED APPT. ON Monday FOR FOLLOW UP- BUT PT. IS ASKING IF ANYONE CAN WORK HER IN TODAY.  PLEASE ADVISE.

## 2022-03-28 ENCOUNTER — OFFICE VISIT (OUTPATIENT)
Dept: ORTHOPEDIC SURGERY | Facility: CLINIC | Age: 22
End: 2022-03-28

## 2022-03-28 VITALS — HEIGHT: 72 IN | WEIGHT: 217 LBS | BODY MASS INDEX: 29.39 KG/M2

## 2022-03-28 DIAGNOSIS — S62.002A CLOSED NONDISPLACED FRACTURE OF SCAPHOID OF LEFT WRIST, UNSPECIFIED PORTION OF SCAPHOID, INITIAL ENCOUNTER: Primary | ICD-10-CM

## 2022-03-28 PROCEDURE — 99214 OFFICE O/P EST MOD 30 MIN: CPT | Performed by: ORTHOPAEDIC SURGERY

## 2022-03-28 PROCEDURE — 29075 APPL CST ELBW FNGR SHORT ARM: CPT | Performed by: ORTHOPAEDIC SURGERY

## 2022-03-28 NOTE — PROGRESS NOTES
"Chief Complaint  Initial Evaluation of the Left Wrist     Subjective      Nayla Rodriguez presents to [facility] for an evaluation of left wrist. Patient was riding a hover board when she had fallen and caught herself with her left wrist. This resulted in pain. This occurred on 3/25/-3/26.     No Known Allergies     Social History     Socioeconomic History   • Marital status:    Tobacco Use   • Smoking status: Never Smoker   • Smokeless tobacco: Never Used   Vaping Use   • Vaping Use: Never used   Substance and Sexual Activity   • Alcohol use: Not Currently   • Drug use: Never   • Sexual activity: Defer        Review of Systems     Objective   Vital Signs:   Ht 182.9 cm (72\")   Wt 98.4 kg (217 lb)   BMI 29.43 kg/m²       Physical Exam  Constitutional:       Appearance: Normal appearance. Patient is well-developed and normal weight.   HENT:      Head: Normocephalic.      Right Ear: Hearing and external ear normal.      Left Ear: Hearing and external ear normal.      Nose: Nose normal.   Eyes:      Conjunctiva/sclera: Conjunctivae normal.   Cardiovascular:      Rate and Rhythm: Normal rate.   Pulmonary:      Effort: Pulmonary effort is normal.      Breath sounds: No wheezing or rales.   Abdominal:      Palpations: Abdomen is soft.      Tenderness: There is no abdominal tenderness.   Musculoskeletal:      Cervical back: Normal range of motion.   Skin:     Findings: No rash.   Neurological:      Mental Status: Patient  is alert and oriented to person, place, and time.   Psychiatric:         Mood and Affect: Mood and affect normal.         Judgment: Judgment normal.       Ortho Exam      LEFT WRIST: Swelling. Tenderness about the snuffbox. Sensation grossly intact. Neurovascular intact.  Radial pulse 2+, ulnar pulse 2+. Negative Tinel's. Patient able to wiggle fingers.       Orthopedic Injury Treatment    Date/Time: 3/28/2022 11:05 AM  Performed by: Ariel Conklin MD  Authorized " by: Ariel Conklin MD   Injury location: wrist  Location details: left wrist  Pre-procedure neurovascular assessment: neurovascularly intact    Anesthesia:  Local anesthesia used: no    Sedation:  Patient sedated: no    Immobilization: cast (short arm)  Splint type: thumb spica  Supplies used: cotton padding (Fiberglass)  Post-procedure neurovascular assessment: post-procedure neurovascularly intact  Patient tolerance: patient tolerated the procedure well with no immediate complications  Comments: Patient was placed in fiberglass cast today.  The patient tolerated the procedure without any complications.                  Imaging Results (Most Recent)     None           Result Review :         XR Wrist 3+ View Left    Result Date: 3/25/2022  Narrative: PROCEDURE: XR WRIST 3+ VW LEFT  COMPARISON: [facility], , WRIST 2V AP AND LAT LT, 11/22/2019, 14:42.  INDICATIONS: fall; left wrist pain  FINDINGS: 3 views were obtained.  No acute fracture or acute malalignment is identified.  If symptoms or clinical concerns persist, consider imaging follow-up.      Impression:  No acute fracture or acute malalignment is identified.      DANIELITO LAW JR, MD       Electronically Signed and Approved By: DANIELITO LAW JR, MD on 3/25/2022 at 0:15                      Assessment and Plan     DX: Rule out scaphoid fracture, left wrist     Discussed treatment plans and diagnosis with the patient. Patient placed into a cast today, she was educated on cast care. Repeat films next visit.    If patient is non-tender at the snuffbox and x-rays are negative, she will be released.  If patient is tender at the snuff box we will obtain an MRI.     Call or return if worsening symptoms.    Follow Up     3 weeks.       Patient was given instructions and counseling regarding her condition or for health maintenance advice. Please see specific information pulled into the AVS if appropriate.     Scribed for Ariel Conklin MD by Debi  Gunner.  03/28/22   10:02 EDT      I have personally performed the services described in this document as scribed by the above individual and it is both accurate and complete. Ariel Conklin MD 03/28/22

## 2022-04-05 DIAGNOSIS — F41.9 ANXIETY: Primary | ICD-10-CM

## 2022-04-05 RX ORDER — BUSPIRONE HYDROCHLORIDE 5 MG/1
5 TABLET ORAL 3 TIMES DAILY
Qty: 90 TABLET | Refills: 1 | Status: SHIPPED | OUTPATIENT
Start: 2022-04-05 | End: 2022-05-09

## 2022-04-07 NOTE — PROGRESS NOTES
Chief Complaint   Patient presents with     Follow Up     Clearance visit for surgery scheduled on April 27th. Reviewed medications with pt, no changes.      Renetta Bennett, Visit Facilitator/MA.     Nayla Rodriguez presents to [facility] with complaints of burning with urination, pelvic pain, increased urgency/frequency, and to discuss anxiety medications.      History of Present Illness  This is a 21-year-old female who presents to the clinic with complaints of burning with urination, pelvic pain, increased urgency/frequency, and to discuss anxiety medications.    Patient states that she has been having UTI symptoms for the past few days, has been taking Azo, which is not really helping.  Patient states she does have some burning with urination, has some pelvic pain, and has some increased urgency/frequency.  Patient's had UTIs in the past, knows that she is got another one now.  Patient states she typically does well with Macrobid, would like this antibiotic if able.  Patient denies any fever/chills/body aches.    Anxiety/depression: Patient was seen by myself approximately 1 month ago due to worsening anxiety/depression since giving birth to her little boy.  Patient states that she is tried Prozac and Zoloft in the past, neither 1 of those really worked for her.  Patient was then started on Wellbutrin, she states that this does not really help either.  Patient does state that the hydroxyzine really does help her at night, states it helps her sleep as well as decreases her anxiety at nighttime.  She would like to stay on it.  Patient would like to try the Collplant testing, as her insurance will cover this.  In the meantime, patient is okay with us starting another medication to see if that would help.  Patient denies any chest pain/shortness of breath.    The following portions of the patient's history were personally reviewed and updated as appropriate: allergies, current medications, past medical history, past surgical history, past family history, and past social history.       Objective   Vital Signs:   /86   Pulse 69   Temp 97.6 °F (36.4 °C)   Resp 16   Ht  "182.9 cm (72\")   Wt 96.3 kg (212 lb 4.8 oz)   SpO2 98%   BMI 28.79 kg/m²     Body mass index is 28.79 kg/m².    All labs, imaging, test results, and specialty provider notes reviewed with patient.     Physical Exam  Vitals reviewed.   Constitutional:       Appearance: Normal appearance.   Cardiovascular:      Rate and Rhythm: Normal rate and regular rhythm.      Pulses: Normal pulses.      Heart sounds: Normal heart sounds.   Pulmonary:      Effort: Pulmonary effort is normal.      Breath sounds: Normal breath sounds.   Neurological:      General: No focal deficit present.      Mental Status: She is alert and oriented to person, place, and time.            Assessment and Plan:  Diagnoses and all orders for this visit:    1. Burning with urination (Primary)  -     POCT urinalysis dipstick, automated  -     Urine Culture - Urine, Urine, Clean Catch  -     GeneSight - Swab,  -     phenazopyridine (Pyridium) 200 MG tablet; Take 1 tablet by mouth 3 (Three) Times a Day As Needed for Bladder Spasms.  Dispense: 42 tablet; Refill: 0    2. Anxiety  Comments:  We will try patient on Lexapro, will also obtain GeneSight testing.  We will see patient back in 1 month, to see if this medication helps or switch.  Orders:  -     cefTRIAXone (ROCEPHIN) 350 mg/ml in lidocaine 1% IM syringe (1 gm vial)  -     escitalopram (Lexapro) 5 MG tablet; Take 1 tablet by mouth Daily.  Dispense: 30 tablet; Refill: 1    3. Acute cystitis with hematuria  Comments:  We will treat patient with Macrobid and give Rocephin shot today.  Also give Pyridium that she can use for UTI symptoms.  We will also send off for culture.  Orders:  -     nitrofurantoin, macrocrystal-monohydrate, (Macrobid) 100 MG capsule; Take 1 capsule by mouth 2 (Two) Times a Day.  Dispense: 14 capsule; Refill: 0  -     phenazopyridine (Pyridium) 200 MG tablet; Take 1 tablet by mouth 3 (Three) Times a Day As Needed for Bladder Spasms.  Dispense: 42 tablet; Refill: " 0      Instructed patient that if her symptoms have not fully resolved after antibiotics, she will need to come in and we will repeat urine culture and further evaluate at that time.    Follow Up:  Return in about 4 weeks (around 3/22/2022).    Patient was given instructions and counseling regarding her condition or for health maintenance advice. Please see specific information pulled into the AVS if appropriate.

## 2022-04-13 ENCOUNTER — OFFICE VISIT (OUTPATIENT)
Dept: ORTHOPEDIC SURGERY | Facility: CLINIC | Age: 22
End: 2022-04-13

## 2022-04-13 VITALS — OXYGEN SATURATION: 98 % | HEIGHT: 72 IN | WEIGHT: 190 LBS | BODY MASS INDEX: 25.73 KG/M2 | HEART RATE: 87 BPM

## 2022-04-13 DIAGNOSIS — M25.532 LEFT WRIST PAIN: ICD-10-CM

## 2022-04-13 DIAGNOSIS — S62.002A CLOSED NONDISPLACED FRACTURE OF SCAPHOID OF LEFT WRIST, UNSPECIFIED PORTION OF SCAPHOID, INITIAL ENCOUNTER: Primary | ICD-10-CM

## 2022-04-13 PROCEDURE — 99213 OFFICE O/P EST LOW 20 MIN: CPT | Performed by: ORTHOPAEDIC SURGERY

## 2022-04-13 NOTE — PROGRESS NOTES
"Chief Complaint  Pain of the Left Wrist     Subjective      Nayla Rodriguez presents to [facility] for a follow-up of left wrist. Patient was riding a hover board when she had fallen and caught herself with her left wrist resulting in pain on 3/25 - 3/26. Cast was removed in office today. She states moving it some and still feeling pain.      No Known Allergies     Social History     Socioeconomic History   • Marital status:    Tobacco Use   • Smoking status: Never Smoker   • Smokeless tobacco: Never Used   Vaping Use   • Vaping Use: Never used   Substance and Sexual Activity   • Alcohol use: Not Currently   • Drug use: Never   • Sexual activity: Defer        Review of Systems     Objective   Vital Signs:   Pulse 87   Ht 182.9 cm (72\")   Wt 86.2 kg (190 lb)   SpO2 98%   BMI 25.77 kg/m²       Physical Exam  Constitutional:       Appearance: Normal appearance. Patient is well-developed and normal weight.   HENT:      Head: Normocephalic.      Right Ear: Hearing and external ear normal.      Left Ear: Hearing and external ear normal.      Nose: Nose normal.   Eyes:      Conjunctiva/sclera: Conjunctivae normal.   Cardiovascular:      Rate and Rhythm: Normal rate.   Pulmonary:      Effort: Pulmonary effort is normal.      Breath sounds: No wheezing or rales.   Abdominal:      Palpations: Abdomen is soft.      Tenderness: There is no abdominal tenderness.   Musculoskeletal:      Cervical back: Normal range of motion.   Skin:     Findings: No rash.   Neurological:      Mental Status: Patient  is alert and oriented to person, place, and time.   Psychiatric:         Mood and Affect: Mood and affect normal.         Judgment: Judgment normal.       Ortho Exam      LEFT WRIST: No swelling, skin discoloration or atrophy. Sensation grossly intact. Neurovascular intact.  Full wrist flexion and extension. Mild tenderness of the snuffbox. Pain with strong's maneuver. "       Procedures        Imaging Results (Most Recent)     Procedure Component Value Units Date/Time    XR Wrist 3+ View Left [[id number]] Resulted: 04/13/22 1011     Updated: 04/13/22 1015           Result Review :     X-Ray Report:  Left wrist(s) X-Ray  Indication: Evaluation of left wrist pain   AP and Lateral view(s)  Findings: No fracture or dislocation is evident.   Prior studies available for comparison: yes            Assessment and Plan     DX: Rule out scaphoid fracture     Patient placed into a thumb brace. An MRI order placed.     If Mri comes back positive for a scaphoid fracture finding. Call back into the office.Will have to commit to a cast.     Call or return if worsening symptoms.    Follow Up     Follow-up after MRI.       Patient was given instructions and counseling regarding her condition or for health maintenance advice. Please see specific information pulled into the AVS if appropriate.     Scribed for Ariel Conklin MD by Debi Martino.  04/13/22   10:34 EDT      I have personally performed the services described in this document as scribed by the above individual and it is both accurate and complete. Ariel Conklin MD 04/13/22

## 2022-04-16 DIAGNOSIS — N30.01 ACUTE CYSTITIS WITH HEMATURIA: Primary | ICD-10-CM

## 2022-04-16 RX ORDER — PHENAZOPYRIDINE HYDROCHLORIDE 200 MG/1
200 TABLET, FILM COATED ORAL 3 TIMES DAILY PRN
Qty: 30 TABLET | Refills: 0 | Status: SHIPPED | OUTPATIENT
Start: 2022-04-16 | End: 2022-05-09

## 2022-04-16 RX ORDER — CEFDINIR 300 MG/1
300 CAPSULE ORAL 2 TIMES DAILY
Qty: 14 CAPSULE | Refills: 0 | Status: SHIPPED | OUTPATIENT
Start: 2022-04-16 | End: 2022-04-23

## 2022-04-19 DIAGNOSIS — N39.0 FREQUENT UTI: Primary | ICD-10-CM

## 2022-05-09 ENCOUNTER — OFFICE VISIT (OUTPATIENT)
Dept: FAMILY MEDICINE CLINIC | Facility: CLINIC | Age: 22
End: 2022-05-09

## 2022-05-09 VITALS
HEART RATE: 87 BPM | OXYGEN SATURATION: 100 % | WEIGHT: 226.1 LBS | RESPIRATION RATE: 20 BRPM | SYSTOLIC BLOOD PRESSURE: 122 MMHG | BODY MASS INDEX: 30.62 KG/M2 | TEMPERATURE: 98 F | HEIGHT: 72 IN | DIASTOLIC BLOOD PRESSURE: 84 MMHG

## 2022-05-09 DIAGNOSIS — F41.9 ANXIETY: Primary | ICD-10-CM

## 2022-05-09 DIAGNOSIS — F51.01 PRIMARY INSOMNIA: ICD-10-CM

## 2022-05-09 PROCEDURE — 99213 OFFICE O/P EST LOW 20 MIN: CPT

## 2022-05-09 RX ORDER — ESCITALOPRAM OXALATE 20 MG/1
20 TABLET ORAL DAILY
Qty: 90 TABLET | Refills: 1 | Status: SHIPPED | OUTPATIENT
Start: 2022-05-09 | End: 2022-08-29 | Stop reason: SDUPTHER

## 2022-05-09 RX ORDER — AMITRIPTYLINE HYDROCHLORIDE 25 MG/1
25 TABLET, FILM COATED ORAL NIGHTLY PRN
Qty: 30 TABLET | Refills: 1 | Status: SHIPPED | OUTPATIENT
Start: 2022-05-09 | End: 2022-08-29 | Stop reason: SDUPTHER

## 2022-05-09 NOTE — PROGRESS NOTES
"Nayla Rodriguez presents to [facility] with complaints of worsening anxiety, issues with the BuSpar.      History of Present Illness  This is a 21-year-old female who presents to clinic with complaints of worsening anxiety and issues with the BuSpar that she was recently prescribed.    Patient states that when she started the BuSpar, states that anytime she turns her head it felt like her eyes would have to catch up.  Patient states it was a weird feeling, did not like the way it made her feel, and also did not control her anxiety.  Patient states that her anxiety is still an all-time high, states that she feels like she is on a beatris and does not know which way to go.  Patient states that the Lexapro works wonders, but does not really help her anxiety.  She states it helps her mood, but does not completely control the increased feelings of anxiety.  Patient also states she is having issues sleeping, states that she cannot fall asleep and then even when she does she will sleep for couple hours and then will wake right back up.  She has tried hydroxyzine in the past, also over-the-counter medications, none of these seem to really help.  She has not tried any other medication, but does not want anything is got a knock her out as she does have a young child.  Patient states that she has tried a friend's Ativan, states that that medication seem to work really well.    The following portions of the patient's history were personally reviewed and updated as appropriate: allergies, current medications, past medical history, past surgical history, past family history, and past social history.       Objective   Vital Signs:   /84   Pulse 87   Temp 98 °F (36.7 °C)   Resp 20   Ht 182.9 cm (72\")   Wt 103 kg (226 lb 1.6 oz)   SpO2 100%   BMI 30.66 kg/m²     Body mass index is 30.66 kg/m².    All labs, imaging, test results, and specialty provider notes reviewed with patient. "     Physical Exam  Vitals reviewed.   Constitutional:       Appearance: Normal appearance.   Cardiovascular:      Rate and Rhythm: Normal rate and regular rhythm.      Pulses: Normal pulses.      Heart sounds: Normal heart sounds.   Pulmonary:      Effort: Pulmonary effort is normal.      Breath sounds: Normal breath sounds.   Neurological:      General: No focal deficit present.      Mental Status: She is alert and oriented to person, place, and time.          Assessment and Plan:  Diagnoses and all orders for this visit:    1. Anxiety (Primary)  -     escitalopram (Lexapro) 20 MG tablet; Take 1 tablet by mouth Daily.  Dispense: 90 tablet; Refill: 1    2. Primary insomnia  -     amitriptyline (ELAVIL) 25 MG tablet; Take 1 tablet by mouth At Night As Needed for Sleep.  Dispense: 30 tablet; Refill: 1      Will discontinue the BuSpar due to adverse effects, we will go ahead and increase the Lexapro as it does help some, and hopefully this will help prevent any really highs of anxiety.  We will also give her some amitriptyline to take at night to help her sleep, I am wondering if the lack of sleep is also contributing to her worsening anxiety.  Can consider other medications in the future if these do not work, we will see patient back in 1 month.  Did discuss with patient that we need to give these medications at least a month to see if they will help with the symptoms.  Patient verbalized understanding of this.    Follow Up:  No follow-ups on file.    Patient was given instructions and counseling regarding her condition or for health maintenance advice. Please see specific information pulled into the AVS if appropriate.

## 2022-05-10 ENCOUNTER — TELEPHONE (OUTPATIENT)
Dept: FAMILY MEDICINE CLINIC | Facility: CLINIC | Age: 22
End: 2022-05-10

## 2022-05-10 NOTE — TELEPHONE ENCOUNTER
Called patient to verify if she has signed a medical records request and she said she did sign a form and that they would fax it over. Patient was okay with us sending her office notes.

## 2022-05-10 NOTE — TELEPHONE ENCOUNTER
"After analyzing paperwork the request is for \"Transfer to ground floor apartment\", called patient to confirm and to find out reasoning. Patient states that she had torn her meniscus years ago, and had a surgery and with her having a baby its hard for her to go up and down the stairs. Informed pt since she has never been seen for that issue she would more than likely need a appointment for this so we could have documentation and maybe other things depending on Bridgett's finding. I will inform Bridgett of this and I told patient I would give her a call back with answer after I have spoken with her. She voiced understanding.   "

## 2022-05-10 NOTE — TELEPHONE ENCOUNTER
Patient sees orthopedics for wrist pain - patient may need new referral to ortho for the torn meniscus. Patient has not been seen for this issue.

## 2022-05-10 NOTE — TELEPHONE ENCOUNTER
----- Message from Nayla Rodriguez sent at 5/10/2022 12:56 PM EDT -----  Regarding: Accommodation   Hey! I just wanted to give you a heads up, my landlord is going to send you another “reasonable accommodation” form to fill out and fax back to her for an apartment transfer. If you could just give her the okay to go ahead with the transfer, that would be awesome!     She requires sooo much paperwork… l’m sorry!     Thank you! Have a blessed day.

## 2022-06-02 ENCOUNTER — TELEPHONE (OUTPATIENT)
Dept: UROLOGY | Facility: CLINIC | Age: 22
End: 2022-06-02

## 2022-06-07 DIAGNOSIS — F41.9 ANXIETY: ICD-10-CM

## 2022-06-07 RX ORDER — BUSPIRONE HYDROCHLORIDE 5 MG/1
TABLET ORAL
Qty: 90 TABLET | Refills: 1 | OUTPATIENT
Start: 2022-06-07

## 2022-06-13 ENCOUNTER — OFFICE VISIT (OUTPATIENT)
Dept: FAMILY MEDICINE CLINIC | Facility: CLINIC | Age: 22
End: 2022-06-13

## 2022-06-13 VITALS
BODY MASS INDEX: 31.36 KG/M2 | HEIGHT: 72 IN | RESPIRATION RATE: 19 BRPM | OXYGEN SATURATION: 97 % | WEIGHT: 231.5 LBS | DIASTOLIC BLOOD PRESSURE: 82 MMHG | SYSTOLIC BLOOD PRESSURE: 126 MMHG | HEART RATE: 85 BPM | TEMPERATURE: 98 F

## 2022-06-13 DIAGNOSIS — F41.0 PANIC ATTACKS: ICD-10-CM

## 2022-06-13 DIAGNOSIS — F41.9 ANXIETY: Primary | ICD-10-CM

## 2022-06-13 PROCEDURE — 99213 OFFICE O/P EST LOW 20 MIN: CPT

## 2022-06-13 NOTE — PROGRESS NOTES
"Nayla Rodriguez presents to [facility] who presents to clinic for 1 month follow-up.      History of Present Illness  This is a 22-year-old female who presents to clinic for 1 month follow-up.    Patient states that her anxiety is better controlled, states that she does notice that Lexapro is helped, but still has these episodes where she feels like she is on the ledge and just cannot step forward.  Patient states that she is at an all-time high with anxiety and stress, but it is improved somewhat.  Patient states that she is tried the BuSpar, had some side effects of that medication, also tried hydroxyzine and did not really get anywhere with that either.  Patient is wondering if Klonopin or Ativan would be better beneficial, states that she has taken 1 of these from a friend, and it made a big difference.  Has tried Wellbutrin, also did not notice any difference with it.  Has tried several medications, is also on amitriptyline to help sleep at night.  Patient has not tried any mood stabilizers, is okay with trying this.  Patient is also okay going to see psychiatry if needed.    The following portions of the patient's history were personally reviewed and updated as appropriate: allergies, current medications, past medical history, past surgical history, past family history, and past social history.       Objective   Vital Signs:   /82   Pulse 85   Temp 98 °F (36.7 °C)   Resp 19   Ht 182.9 cm (72\")   Wt 105 kg (231 lb 8 oz)   SpO2 97%   BMI 31.40 kg/m²     Body mass index is 31.4 kg/m².    All labs, imaging, test results, and specialty provider notes reviewed with patient.     Physical Exam  Vitals reviewed.   Constitutional:       Appearance: Normal appearance.   Cardiovascular:      Rate and Rhythm: Normal rate and regular rhythm.      Pulses: Normal pulses.      Heart sounds: Normal heart sounds.   Pulmonary:      Effort: Pulmonary effort is normal.      Breath " sounds: Normal breath sounds.   Neurological:      General: No focal deficit present.      Mental Status: She is alert and oriented to person, place, and time.          Assessment and Plan:  Diagnoses and all orders for this visit:    1. Anxiety (Primary)    2. Panic attacks    Other orders  -     Cariprazine HCl (Vraylar) 1.5 MG capsule capsule; Take 1 capsule by mouth Daily.  Dispense: 30 capsule; Refill: 0      We will go ahead and start patient on Vraylar, did discuss with her that this is a mood stabilizer and should help bridge the gap that the Lexapro was not covering.  Patient to remain on Lexapro, 20 mg.  Did discuss with her that if this does not help with her symptoms, we will send her to psychiatry for further evaluation.  Patient is okay with this, did give her samples of the Vraylar to try.  Patient verbalized understanding of treatment plan.    Follow Up:  No follow-ups on file.    Patient was given instructions and counseling regarding her condition or for health maintenance advice. Please see specific information pulled into the AVS if appropriate.

## 2022-06-25 ENCOUNTER — PATIENT MESSAGE (OUTPATIENT)
Dept: FAMILY MEDICINE CLINIC | Facility: CLINIC | Age: 22
End: 2022-06-25

## 2022-06-25 DIAGNOSIS — F41.1 GENERALIZED ANXIETY DISORDER: Primary | ICD-10-CM

## 2022-07-06 NOTE — TELEPHONE ENCOUNTER
From: Nayla Rodriguez  To: Bridgett CHRIS Chin  Sent: 6/25/2022 7:39 PM EDT  Subject: Update on new medication    Mike Urias! It’s been almost two weeks since I last saw you. The medication you gave me samples of, I don’t quite remember what it’s called, but I dont think it is doing anything for me. Unfortunately. I was really hoping this one would work. I’m starting to lose hope in all medicines. I’m not really sure what to do or where to go from here. I’m sorry it’s been so difficult with me.     As to tell you how I’m feeling, every time my  leaves the house my thoughts race. Always to the worst scenarios. If that makes sense. It’s selvin like I have an anchor tied to my ankle in the bottom of the ocean but I’ve been trying to untie it and get lose but it seems impossible.

## 2022-08-29 DIAGNOSIS — F41.9 ANXIETY: ICD-10-CM

## 2022-08-29 DIAGNOSIS — F51.01 PRIMARY INSOMNIA: ICD-10-CM

## 2022-08-29 RX ORDER — AMITRIPTYLINE HYDROCHLORIDE 25 MG/1
25 TABLET, FILM COATED ORAL NIGHTLY PRN
Qty: 30 TABLET | Refills: 1 | Status: SHIPPED | OUTPATIENT
Start: 2022-08-29 | End: 2022-10-26 | Stop reason: SDUPTHER

## 2022-08-29 RX ORDER — ESCITALOPRAM OXALATE 20 MG/1
20 TABLET ORAL DAILY
Qty: 90 TABLET | Refills: 1 | Status: SHIPPED | OUTPATIENT
Start: 2022-08-29

## 2022-10-26 DIAGNOSIS — F51.01 PRIMARY INSOMNIA: ICD-10-CM

## 2022-10-26 RX ORDER — AMITRIPTYLINE HYDROCHLORIDE 25 MG/1
25 TABLET, FILM COATED ORAL NIGHTLY PRN
Qty: 30 TABLET | Refills: 1 | Status: SHIPPED | OUTPATIENT
Start: 2022-10-26

## 2022-11-02 ENCOUNTER — TELEPHONE (OUTPATIENT)
Dept: FAMILY MEDICINE CLINIC | Facility: CLINIC | Age: 22
End: 2022-11-02

## 2022-11-02 DIAGNOSIS — F51.01 PRIMARY INSOMNIA: ICD-10-CM

## 2022-11-02 RX ORDER — AMITRIPTYLINE HYDROCHLORIDE 25 MG/1
25 TABLET, FILM COATED ORAL NIGHTLY PRN
Qty: 30 TABLET | Refills: 1 | OUTPATIENT
Start: 2022-11-02

## 2022-11-02 NOTE — TELEPHONE ENCOUNTER
Informed patient of the following. Patient states that she no longer feels as if she needs to see psych and that the medication she was on was working fine. Per patients last office note hydroxyzine was not working as intended. Patient states that Bridgett was prescribing this medication for anxiety and she was taking it for sleep. Informed patient if she would like to discuss being placed back on this medication or something else she will need an appointment. Patient is scheduled for 11/23.

## 2022-11-02 NOTE — TELEPHONE ENCOUNTER
Tried calling patient to inform her that the medication has been discontinued and we are unable to refill this because patient also no showed her appointment with psych but patient did not answer.

## 2022-11-02 NOTE — TELEPHONE ENCOUNTER
Patient is requesting to get hydroxyzine refilled. Advised patient that this medication was discontinued. Patient is still requesting refill.

## 2022-11-22 ENCOUNTER — TELEPHONE (OUTPATIENT)
Dept: FAMILY MEDICINE CLINIC | Facility: CLINIC | Age: 22
End: 2022-11-22

## 2022-11-22 NOTE — TELEPHONE ENCOUNTER
LVMTCB with patients spouse to call office regarding appointment.     Patient cancelled their appointment scheduled for 11/23 with Bridgett Chin. Would patient like to reschedule?       HUB TO READ AND SHARE

## 2022-12-07 ENCOUNTER — OFFICE VISIT (OUTPATIENT)
Dept: FAMILY MEDICINE CLINIC | Facility: CLINIC | Age: 22
End: 2022-12-07

## 2022-12-07 VITALS
HEART RATE: 102 BPM | OXYGEN SATURATION: 99 % | RESPIRATION RATE: 20 BRPM | TEMPERATURE: 98 F | DIASTOLIC BLOOD PRESSURE: 82 MMHG | SYSTOLIC BLOOD PRESSURE: 118 MMHG

## 2022-12-07 DIAGNOSIS — K08.89 TOOTH PAIN: ICD-10-CM

## 2022-12-07 DIAGNOSIS — B07.8 OTHER VIRAL WARTS: Primary | ICD-10-CM

## 2022-12-07 PROCEDURE — 99214 OFFICE O/P EST MOD 30 MIN: CPT

## 2022-12-07 RX ORDER — IBUPROFEN 800 MG/1
800 TABLET ORAL EVERY 12 HOURS PRN
Qty: 60 TABLET | Refills: 1 | Status: SHIPPED | OUTPATIENT
Start: 2022-12-07

## 2022-12-07 RX ORDER — AMOXICILLIN 875 MG/1
TABLET, COATED ORAL
COMMUNITY
Start: 2022-11-02

## 2022-12-07 NOTE — PROGRESS NOTES
Nayla Rodriguez presents to [facility] with complaints of wart irritation and possible cryotherapy.      History of Present Illness  This is a 22-year-old female who presents to the clinic with complaints of wart irritation and possible cryotherapy, also is having some tooth pain due to an infected tooth.    Patient states that she has had issues with his work for several years, states that she is also got a wart on her ankle area as well, but does not provide her as much irritation as she does on her right finger.  States that she has had this froze off in the past, states that it did go completely away, but over the past few days to 1 to 2 weeks, it has returned and is continuing to get bigger in size.  She states that it is very irritable, does get caught on things, and is wondering if we can refreeze this off.  States that it comes back in the same place every time, has never been to dermatology to have this evaluated either.    Also states that she has a tooth that needs to be pulled, but cannot have the oral surgery performed until January.  Patient states that the pain is pretty intense, states that it sometimes will keep her up at night, is wondering she can have some ibuprofen to help for acute bouts of pain.    The following portions of the patient's history were personally reviewed and updated as appropriate: allergies, current medications, past medical history, past surgical history, past family history, and past social history.       Objective   Vital Signs:   /82   Pulse 102   Temp 98 °F (36.7 °C)   Resp 20   SpO2 99%     There is no height or weight on file to calculate BMI.    All labs, imaging, test results, and specialty provider notes reviewed with patient.     Physical Exam  Vitals reviewed.   Constitutional:       Appearance: Normal appearance.   Skin:     Comments: Wart noted on right hand of index finger.   Neurological:      General: No focal  deficit present.      Mental Status: She is alert and oriented to person, place, and time.            Cryotherapy, Skin Lesion    Date/Time: 12/7/2022 4:30 PM  Performed by: Bridgett Chin APRN  Authorized by: Bridgett Chin APRN   Preparation: Patient was prepped and draped in the usual sterile fashion.  Local anesthesia used: no    Anesthesia:  Local anesthesia used: no    Sedation:  Patient sedated: no    Patient tolerance: patient tolerated the procedure well with no immediate complications                  Assessment and Plan:  Diagnoses and all orders for this visit:    1. Other viral warts (Primary)  -     Ambulatory Referral to Dermatology    2. Tooth pain  -     ibuprofen (ADVIL,MOTRIN) 800 MG tablet; Take 1 tablet by mouth Every 12 (Twelve) Hours As Needed for Mild Pain.  Dispense: 60 tablet; Refill: 1    Other orders  -     Cryotherapy, Skin Lesion      Did perform cryotherapy on the viral wart, discussed with her that we will go ahead and send her to dermatology as well, since this is a recurring wart, they may be able to 5 or move her minute solution and can also remove the wart on her ankle as well.  We will also provide patient with some ibuprofen that she can use for acute pain to help with the tooth that needs to be pulled until she is able to get this done in January.    Follow Up:  No follow-ups on file.    Patient was given instructions and counseling regarding her condition or for health maintenance advice. Please see specific information pulled into the AVS if appropriate.

## 2023-01-16 DIAGNOSIS — Z72.0 TOBACCO USE: Primary | ICD-10-CM

## 2023-01-16 RX ORDER — NICOTINE 21 MG/24HR
1 PATCH, TRANSDERMAL 24 HOURS TRANSDERMAL EVERY 24 HOURS
Qty: 28 EACH | Refills: 0 | Status: SHIPPED | OUTPATIENT
Start: 2023-01-16

## 2023-01-28 ENCOUNTER — HOSPITAL ENCOUNTER (EMERGENCY)
Facility: HOSPITAL | Age: 23
Discharge: HOME | End: 2023-01-28
Attending: EMERGENCY MEDICINE | Admitting: EMERGENCY MEDICINE
Payer: COMMERCIAL

## 2023-01-28 VITALS
WEIGHT: 228.84 LBS | HEIGHT: 72 IN | RESPIRATION RATE: 16 BRPM | SYSTOLIC BLOOD PRESSURE: 124 MMHG | OXYGEN SATURATION: 98 % | DIASTOLIC BLOOD PRESSURE: 85 MMHG | TEMPERATURE: 98.2 F | HEART RATE: 87 BPM | BODY MASS INDEX: 31 KG/M2

## 2023-01-28 DIAGNOSIS — R52 GENERALIZED BODY ACHES: ICD-10-CM

## 2023-01-28 DIAGNOSIS — J02.0 PHARYNGITIS DUE TO STREPTOCOCCUS SPECIES: Primary | ICD-10-CM

## 2023-01-28 LAB
FLUAV AG NPH QL: NEGATIVE
FLUBV AG NPH QL IA: NEGATIVE
S PYO AG THROAT QL: POSITIVE
SARS-COV-2 RNA PNL SPEC NAA+PROBE: NOT DETECTED

## 2023-01-28 PROCEDURE — U0004 COV-19 TEST NON-CDC HGH THRU: HCPCS | Performed by: EMERGENCY MEDICINE

## 2023-01-28 PROCEDURE — 96372 THER/PROPH/DIAG INJ SC/IM: CPT

## 2023-01-28 PROCEDURE — 87804 INFLUENZA ASSAY W/OPTIC: CPT | Performed by: EMERGENCY MEDICINE

## 2023-01-28 PROCEDURE — 99283 EMERGENCY DEPT VISIT LOW MDM: CPT

## 2023-01-28 PROCEDURE — 87880 STREP A ASSAY W/OPTIC: CPT | Performed by: EMERGENCY MEDICINE

## 2023-01-28 PROCEDURE — 25010000002 PENICILLIN G BENZATHINE PER 1200000 UNITS

## 2023-01-28 PROCEDURE — C9803 HOPD COVID-19 SPEC COLLECT: HCPCS | Performed by: EMERGENCY MEDICINE

## 2023-01-28 RX ORDER — ONDANSETRON 4 MG/1
4 TABLET, ORALLY DISINTEGRATING ORAL 4 TIMES DAILY PRN
Qty: 20 TABLET | Refills: 0 | Status: SHIPPED | OUTPATIENT
Start: 2023-01-28 | End: 2023-04-07

## 2023-01-28 RX ADMIN — PENICILLIN G BENZATHINE 1.2 MILLION UNITS: 1200000 INJECTION, SUSPENSION INTRAMUSCULAR at 09:28

## 2023-01-28 RX ADMIN — SODIUM CHLORIDE 1000 ML: 9 INJECTION, SOLUTION INTRAVENOUS at 09:27

## 2023-02-13 DIAGNOSIS — K08.89 TOOTH PAIN: ICD-10-CM

## 2023-02-13 RX ORDER — IBUPROFEN 800 MG/1
TABLET ORAL
Qty: 60 TABLET | Refills: 1 | OUTPATIENT
Start: 2023-02-13

## 2023-02-13 NOTE — TELEPHONE ENCOUNTER
What is patient needing this refilled for?  I first sent this in for tooth pain, and was refilled 2 months ago, is she taking this twice a day every day?

## 2023-04-07 PROCEDURE — 87086 URINE CULTURE/COLONY COUNT: CPT | Performed by: NURSE PRACTITIONER

## 2023-04-07 PROCEDURE — 87186 SC STD MICRODIL/AGAR DIL: CPT | Performed by: NURSE PRACTITIONER

## 2023-04-07 PROCEDURE — 87088 URINE BACTERIA CULTURE: CPT | Performed by: NURSE PRACTITIONER

## 2023-10-26 ENCOUNTER — TELEPHONE (OUTPATIENT)
Dept: FAMILY MEDICINE CLINIC | Facility: CLINIC | Age: 23
End: 2023-10-26
Payer: COMMERCIAL

## 2023-10-26 NOTE — TELEPHONE ENCOUNTER
Patient requested appointment for sore throat that has lasted for 2-3 days. Patient is concerned she may have strep throat. No open availability. Patient advised to be seen in the urgent care, patient voiced understanding.

## 2023-11-30 NOTE — PROGRESS NOTES
OBSTETRIC HISTORY AND PHYSICAL     Subjective:  Nayla Rodriguez is a 23 y.o.  at 5w5d here for her new OB visit. Patient pregnancy is dated by a LMP. She is taking her prenatal vitamins.Reports no loss of fluid or vaginal bleeding.No hx of genetic, bleeding, endocrine, chromosome disorder in both patient and partner. No history of multiple gestations, congenital anomalies or mental retardation.    Current feelings about pregnancy: excited  Current medications: PNV  Hospitalization/ED since pregnant: none  Previous pregnancy hx: denies  Abdominal surgeries: denies  Tobacco, alcohol, illlicit drugs: denies  Hx of STIs including Herpes: denies  Hx of abnormal PAP smear: Pap done today  Personal Family Hx of Br, uterine, ovarian or colon cancer:  denies  Congential: denies    Discussed adequate water intake, food guidelines/weight gain, limit caffiene to less than 200mg daily.   Discussed food, activities to avoid. Discussed seatbelt safety.   Reviewed safe meds in pregnancy handout.  Taking PNV: yes  Smoking cessation needed: no    Reviewed and updated:  OBHx, GYNHx (STDs), PMHx, Medications, Allergies, PSHx, Social Hx, Preventative Hx (PAP), Hx of abuse/safe environment, Vaccine Hx including hx of chickenpox or vaccine, Genetic Hx (pt, FOB, both families).        OB History    Para Term  AB Living   2 1 1 0 0 1   SAB IAB Ectopic Molar Multiple Live Births   0 0 0 0   1      # Outcome Date GA Lbr Mateusz/2nd Weight Sex Delivery Anes PTL Lv   2 Current            1 Term 22 37w3d 02:54 / 00:10 2930 g (6 lb 7.4 oz) M Vag-Spont None N RADHA      Complications: Precipitant delivery     Past Medical History:   Diagnosis Date    Anxiety     Contusion 2015    Depression      Past Surgical History:   Procedure Laterality Date    KNEE ARTHROSCOPY W/ MENISCECTOMY Left     TONSILLECTOMY      WISDOM TOOTH EXTRACTION       Family History   Problem Relation Age of Onset    Diabetes Mother     Breast cancer  Neg Hx     Ovarian cancer Neg Hx     Uterine cancer Neg Hx     Cervical cancer Neg Hx     Colon cancer Neg Hx     Skin cancer Neg Hx     Malig Hyperthermia Neg Hx     Clotting disorder Neg Hx      No Known Allergies  Social History     Socioeconomic History    Marital status:     Number of children: 1   Tobacco Use    Smoking status: Never     Passive exposure: Never    Smokeless tobacco: Never   Vaping Use    Vaping Use: Every day    Substances: Nicotine, Flavoring    Devices: Disposable   Substance and Sexual Activity    Alcohol use: Not Currently    Drug use: Never    Sexual activity: Yes     Partners: Male     Birth control/protection: None         ROS:  General ROS: negative for - chills or fatigue  Respiratory ROS: negative for - cough or hemoptysis  Cardiovascular ROS: negative for - chest pain or dyspnea on exertion  Gastrointestinal ROS: negative for - abdominal pain or appetite loss  Musculoskeletal ROS: negative for - gait disturbance or joint pain  Neurological ROS: negative for - behavioral changes or bowel and bladder control changes  Dermatological ROS: negative for rashes or lesions     Objective:  Physical Exam:   Vitals:    12/01/23 1128   BP: 116/80       General appearance - alert, well appearing, and in no distress  Mental status - alert, oriented to person, place, and time  Heart- Regular rate and rhythm   Lungs- No labored breathing  Breasts- Deferred to annual  Abdomen- Soft, Gravid uterus, non-tender  Extremeties: Normal ROM, Negative swelling or cyanosis    Pelvic exam:   External genitalia- Normal, no lesions  Urethra- Normal, no masses, non tender  Vagina- Normal, no discharge  Bladder- Normal, no masses, non tender  Cervix- Normal, no lesions, no discharge, no CMT  Uterus- Normal shape and consistency, non tender, Consistent with dates, Bedside US consistent with dates.  -160..   Adnexa- Normal, no mass, non tender    Counseling:   Nutrition discussed, calories,  activity/exercise in pregnancy  Discussed dietary restrictions/safety food preparation in pregnancy  Reviewed what to expect prenatal visits, office providers (female and male) and covering [facility]/Dr. Chappell  Appropriate trimester precautions provided, N/V, vag bleeding, cramping  Questions answered  Discussed healthy weight gain.  Also discussed increased water intake, 200mg of caffeine daily, exercise and healthy eating habits.  Encouraged patient to consider breastfeeding. Discussed that it is recommended by the CDC and WHO that women exclusively breastfeed for the first 6 months and continue to breastfeed up to one year. Discussed the health benefits of breastfeeding, including increased immune systems in infants, reduced risk of food allergies, and reduced risk of chronic disease as an adult. Maternal benefits include more PP weight loss, reduced risk of PP depression, breast/ovarian cancer risk reduced.     ASSESSMENT/PLAN:   Diagnoses and all orders for this visit:    1. Supervision of other normal pregnancy, antepartum (Primary)  Assessment & Plan:  IKE finalized: 7/28/2024  Dating US ordered    Genetic testing (NIPS-Quad)/CF/AFP:  discussed    COVID: recommended  Flu: recommended  Tdap: 27-36 weeks  RSV: 32-36 weeks    Rhogam:A pos  ?Sterilization:    Anatomy US:  FU US:    PROBLEM LIST/PLAN:     Orders:  -     POC Urinalysis Dipstick  -     Urine Drug Screen - Urine, Clean Catch; Future  -     Urine Culture - Urine, Urine, Clean Catch  -     OB Panel With HIV; Future  -     Hemoglobinopathy Fractionation Cascade  -     IgP, Aptima HPV; Future  -     Chlamydia trachomatis, Neisseria gonorrhoeae, PCR - , Cervix; Future  -     US Ob < 14 Weeks Single or First Gestation; Future  -     Urine Drug Screen - Urine, Clean Catch  -     IgP, Aptima HPV  -     Chlamydia trachomatis, Neisseria gonorrhoeae, PCR - , Cervix    2. Asymptomatic bacteriuria in pregnancy  -     cephalexin (KEFLEX) 500 MG capsule;  Take 1 capsule by mouth 2 (Two) Times a Day.  Dispense: 14 capsule; Refill: 0      Bedside ultrasound today showed fetal pole with cardiac activity.  Discussed since early gestation is noted I recommend follow-up in 2 weeks with a formal dating ultrasound.  We will also do lab work at that time    Return in about 2 weeks (around 12/15/2023).    We have gone over the expected prenatal care to include the timing and content of visits including the anatomy ultrasound.  We discussed the content of the anatomy ultrasound and its limitations (the fact that ultrasound in general may not see up to 40% of abnormalities).  I informed her how to contact the office and/or on call person in the event of any problems and encouraged her to do so when she feels it is necessary.  We then spent time answering her questions which she indicated were answered to her satisfaction.    Bambi Hill,   12/1/2023 12:26 EST

## 2023-12-01 ENCOUNTER — INITIAL PRENATAL (OUTPATIENT)
Dept: OBSTETRICS AND GYNECOLOGY | Facility: CLINIC | Age: 23
End: 2023-12-01
Payer: COMMERCIAL

## 2023-12-01 ENCOUNTER — TELEPHONE (OUTPATIENT)
Dept: OBSTETRICS AND GYNECOLOGY | Facility: CLINIC | Age: 23
End: 2023-12-01

## 2023-12-01 VITALS — WEIGHT: 232 LBS | DIASTOLIC BLOOD PRESSURE: 80 MMHG | BODY MASS INDEX: 31.46 KG/M2 | SYSTOLIC BLOOD PRESSURE: 116 MMHG

## 2023-12-01 DIAGNOSIS — Z34.80 SUPERVISION OF OTHER NORMAL PREGNANCY, ANTEPARTUM: Primary | ICD-10-CM

## 2023-12-01 DIAGNOSIS — O99.891 ASYMPTOMATIC BACTERIURIA IN PREGNANCY: ICD-10-CM

## 2023-12-01 DIAGNOSIS — R82.71 ASYMPTOMATIC BACTERIURIA IN PREGNANCY: ICD-10-CM

## 2023-12-01 LAB
AMPHET+METHAMPHET UR QL: NEGATIVE
BARBITURATES UR QL SCN: NEGATIVE
BENZODIAZ UR QL SCN: NEGATIVE
C TRACH RRNA CVX QL NAA+PROBE: NOT DETECTED
CANNABINOIDS SERPL QL: NEGATIVE
COCAINE UR QL: NEGATIVE
FENTANYL UR-MCNC: NEGATIVE NG/ML
GLUCOSE UR STRIP-MCNC: NEGATIVE MG/DL
METHADONE UR QL SCN: NEGATIVE
N GONORRHOEA RRNA SPEC QL NAA+PROBE: NOT DETECTED
OPIATES UR QL: NEGATIVE
OXYCODONE UR QL SCN: NEGATIVE
PROT UR STRIP-MCNC: NEGATIVE MG/DL

## 2023-12-01 PROCEDURE — 87591 N.GONORRHOEAE DNA AMP PROB: CPT | Performed by: STUDENT IN AN ORGANIZED HEALTH CARE EDUCATION/TRAINING PROGRAM

## 2023-12-01 PROCEDURE — 80307 DRUG TEST PRSMV CHEM ANLYZR: CPT | Performed by: STUDENT IN AN ORGANIZED HEALTH CARE EDUCATION/TRAINING PROGRAM

## 2023-12-01 PROCEDURE — 87491 CHLMYD TRACH DNA AMP PROBE: CPT | Performed by: STUDENT IN AN ORGANIZED HEALTH CARE EDUCATION/TRAINING PROGRAM

## 2023-12-01 PROCEDURE — 87086 URINE CULTURE/COLONY COUNT: CPT | Performed by: STUDENT IN AN ORGANIZED HEALTH CARE EDUCATION/TRAINING PROGRAM

## 2023-12-01 RX ORDER — PRENATAL VIT/IRON FUM/FOLIC AC 27MG-0.8MG
1 TABLET ORAL DAILY
COMMUNITY

## 2023-12-01 RX ORDER — CEPHALEXIN 500 MG/1
500 CAPSULE ORAL 2 TIMES DAILY
Qty: 14 CAPSULE | Refills: 0 | Status: SHIPPED | OUTPATIENT
Start: 2023-12-01

## 2023-12-01 NOTE — ASSESSMENT & PLAN NOTE
IKE finalized: 7/28/2024  Dating US ordered    Genetic testing (NIPS-Quad)/CF/AFP:  discussed    COVID: recommended  Flu: recommended  Tdap: 27-36 weeks  RSV: 32-36 weeks    Rhogam:A pos  ?Sterilization:    Anatomy US:  FU US:    PROBLEM LIST/PLAN:

## 2023-12-02 LAB — BACTERIA SPEC AEROBE CULT: NORMAL

## 2023-12-05 LAB
CYTOLOGIST CVX/VAG CYTO: ABNORMAL
CYTOLOGY CVX/VAG DOC CYTO: ABNORMAL
CYTOLOGY CVX/VAG DOC THIN PREP: ABNORMAL
DX ICD CODE: ABNORMAL
HIV 1 & 2 AB SER-IMP: ABNORMAL
HPV I/H RISK 4 DNA CVX QL PROBE+SIG AMP: POSITIVE
OTHER STN SPEC: ABNORMAL
STAT OF ADQ CVX/VAG CYTO-IMP: ABNORMAL

## 2023-12-05 NOTE — TELEPHONE ENCOUNTER
Caller: Nayla Rodriguez    Relationship: Self    Best call back number: [phone]    What was the call regarding: PT WOULD LIKE TO KNOW WHY SHE WAS PRESCRIBED KEFLEX.   
Left message for patient.  
Patient informed she does not need to take antibiotic.  
Please advise in regards to keflex that was sent in. Does she need to take this?  
Quality 431: Preventive Care And Screening: Unhealthy Alcohol Use - Screening: Patient screened for unhealthy alcohol use using a single question and scores less than 2 times per year
Quality 110: Preventive Care And Screening: Influenza Immunization: Influenza Immunization Administered during Influenza season
Detail Level: Detailed

## 2023-12-14 ENCOUNTER — HOSPITAL ENCOUNTER (EMERGENCY)
Facility: HOSPITAL | Age: 23
Discharge: LEFT BEFORE DISCHARGE | End: 2023-12-14
Attending: EMERGENCY MEDICINE
Payer: COMMERCIAL

## 2023-12-14 ENCOUNTER — APPOINTMENT (OUTPATIENT)
Dept: ULTRASOUND IMAGING | Facility: HOSPITAL | Age: 23
End: 2023-12-14
Payer: COMMERCIAL

## 2023-12-14 ENCOUNTER — TELEPHONE (OUTPATIENT)
Dept: OBSTETRICS AND GYNECOLOGY | Facility: CLINIC | Age: 23
End: 2023-12-14
Payer: COMMERCIAL

## 2023-12-14 VITALS
BODY MASS INDEX: 31.35 KG/M2 | HEIGHT: 72 IN | DIASTOLIC BLOOD PRESSURE: 86 MMHG | OXYGEN SATURATION: 100 % | HEART RATE: 79 BPM | SYSTOLIC BLOOD PRESSURE: 156 MMHG | TEMPERATURE: 98.1 F | WEIGHT: 231.48 LBS | RESPIRATION RATE: 16 BRPM

## 2023-12-14 LAB
ABO GROUP BLD: NORMAL
ALBUMIN SERPL-MCNC: 3.9 G/DL (ref 3.5–5.2)
ALBUMIN/GLOB SERPL: 1.1 G/DL
ALP SERPL-CCNC: 71 U/L (ref 39–117)
ALT SERPL W P-5'-P-CCNC: 11 U/L (ref 1–33)
ANION GAP SERPL CALCULATED.3IONS-SCNC: 11 MMOL/L (ref 5–15)
AST SERPL-CCNC: 17 U/L (ref 1–32)
BASOPHILS # BLD AUTO: 0.04 10*3/MM3 (ref 0–0.2)
BASOPHILS NFR BLD AUTO: 0.4 % (ref 0–1.5)
BILIRUB SERPL-MCNC: 0.2 MG/DL (ref 0–1.2)
BLD GP AB SCN SERPL QL: NEGATIVE
BUN SERPL-MCNC: 6 MG/DL (ref 6–20)
BUN/CREAT SERPL: 10.2 (ref 7–25)
CALCIUM SPEC-SCNC: 9.1 MG/DL (ref 8.6–10.5)
CHLORIDE SERPL-SCNC: 101 MMOL/L (ref 98–107)
CO2 SERPL-SCNC: 21 MMOL/L (ref 22–29)
CREAT SERPL-MCNC: 0.59 MG/DL (ref 0.57–1)
DEPRECATED RDW RBC AUTO: 39.5 FL (ref 37–54)
EGFRCR SERPLBLD CKD-EPI 2021: 130.9 ML/MIN/1.73
EOSINOPHIL # BLD AUTO: 0.16 10*3/MM3 (ref 0–0.4)
EOSINOPHIL NFR BLD AUTO: 1.6 % (ref 0.3–6.2)
ERYTHROCYTE [DISTWIDTH] IN BLOOD BY AUTOMATED COUNT: 13.1 % (ref 12.3–15.4)
GLOBULIN UR ELPH-MCNC: 3.7 GM/DL
GLUCOSE SERPL-MCNC: 92 MG/DL (ref 65–99)
HCG INTACT+B SERPL-ACNC: NORMAL MIU/ML
HCT VFR BLD AUTO: 38.7 % (ref 34–46.6)
HGB BLD-MCNC: 12.7 G/DL (ref 12–15.9)
IMM GRANULOCYTES # BLD AUTO: 0.03 10*3/MM3 (ref 0–0.05)
IMM GRANULOCYTES NFR BLD AUTO: 0.3 % (ref 0–0.5)
LYMPHOCYTES # BLD AUTO: 2.42 10*3/MM3 (ref 0.7–3.1)
LYMPHOCYTES NFR BLD AUTO: 23.6 % (ref 19.6–45.3)
MCH RBC QN AUTO: 27.2 PG (ref 26.6–33)
MCHC RBC AUTO-ENTMCNC: 32.8 G/DL (ref 31.5–35.7)
MCV RBC AUTO: 82.9 FL (ref 79–97)
MONOCYTES # BLD AUTO: 0.68 10*3/MM3 (ref 0.1–0.9)
MONOCYTES NFR BLD AUTO: 6.6 % (ref 5–12)
NEUTROPHILS NFR BLD AUTO: 6.91 10*3/MM3 (ref 1.7–7)
NEUTROPHILS NFR BLD AUTO: 67.5 % (ref 42.7–76)
NRBC BLD AUTO-RTO: 0 /100 WBC (ref 0–0.2)
PLATELET # BLD AUTO: 257 10*3/MM3 (ref 140–450)
PMV BLD AUTO: 9.6 FL (ref 6–12)
POTASSIUM SERPL-SCNC: 3.8 MMOL/L (ref 3.5–5.2)
PROT SERPL-MCNC: 7.6 G/DL (ref 6–8.5)
RBC # BLD AUTO: 4.67 10*6/MM3 (ref 3.77–5.28)
RH BLD: POSITIVE
SODIUM SERPL-SCNC: 133 MMOL/L (ref 136–145)
WBC NRBC COR # BLD AUTO: 10.24 10*3/MM3 (ref 3.4–10.8)

## 2023-12-14 PROCEDURE — 76801 OB US < 14 WKS SINGLE FETUS: CPT

## 2023-12-14 PROCEDURE — 85025 COMPLETE CBC W/AUTO DIFF WBC: CPT

## 2023-12-14 PROCEDURE — 99284 EMERGENCY DEPT VISIT MOD MDM: CPT

## 2023-12-14 PROCEDURE — 86901 BLOOD TYPING SEROLOGIC RH(D): CPT

## 2023-12-14 PROCEDURE — 80053 COMPREHEN METABOLIC PANEL: CPT

## 2023-12-14 PROCEDURE — 36415 COLL VENOUS BLD VENIPUNCTURE: CPT

## 2023-12-14 PROCEDURE — 63710000001 ONDANSETRON ODT 4 MG TABLET DISPERSIBLE

## 2023-12-14 PROCEDURE — 86900 BLOOD TYPING SEROLOGIC ABO: CPT

## 2023-12-14 PROCEDURE — 84702 CHORIONIC GONADOTROPIN TEST: CPT

## 2023-12-14 PROCEDURE — 86850 RBC ANTIBODY SCREEN: CPT

## 2023-12-14 RX ORDER — ACETAMINOPHEN 325 MG/1
975 TABLET ORAL ONCE
Status: COMPLETED | OUTPATIENT
Start: 2023-12-14 | End: 2023-12-14

## 2023-12-14 RX ORDER — ONDANSETRON 4 MG/1
4 TABLET, ORALLY DISINTEGRATING ORAL ONCE
Status: COMPLETED | OUTPATIENT
Start: 2023-12-14 | End: 2023-12-14

## 2023-12-14 RX ADMIN — ACETAMINOPHEN 975 MG: 325 TABLET ORAL at 11:39

## 2023-12-14 RX ADMIN — ONDANSETRON 4 MG: 4 TABLET, ORALLY DISINTEGRATING ORAL at 11:39

## 2023-12-14 NOTE — TELEPHONE ENCOUNTER
Hub staff attempted to follow warm transfer process and was unsuccessful     Caller: Nayla Rodriguez    Relationship to patient: Self    Best call back number: [phone]    Patient is needing: OB PT 7W 4D STATES SHE HAS HAD CRAMPING AND WHEN SHE USED THE RESTROOM THIS MORNING THERE WAS A LOT OF BLEEDING. PT DENIES HAVING ANY RECENT INTERCOURSE, PT WANTING TO KNOW IF THIS IS NORMAL, PLEASE ADVISE PT.

## 2023-12-14 NOTE — ED PROVIDER NOTES
Time: 11:32 AM EST  Date of encounter:  12/14/2023  Independent Historian/Clinical History and Information was obtained by:   Patient    History is limited by: N/A    Chief Complaint   Patient presents with    Vaginal Bleeding - Pregnant         History of Present Illness:  Patient is a G2, P1 23 y.o. year old female who presents to the emergency department for evaluation of pelvic cramping and vaginal bleeding with clotting that started this morning while she went to urinate.  Patient states that at that time she put on a pad and when she urinated again she did not put a pad back on.  She states it hurts more on the right than the left.  She has already seen her OB/GYN who she tried to call today but was unable to get in this morning.  She admits to nausea and denies vomiting, diarrhea, lightheadedness or dizziness.  Her LMP was September 22, 2023.    Patient Care Team  Primary Care Provider: Bridgett Chin APRN    Past Medical History:     No Known Allergies  Past Medical History:   Diagnosis Date    Anxiety     Contusion 05/05/2015    Depression      Past Surgical History:   Procedure Laterality Date    KNEE ARTHROSCOPY W/ MENISCECTOMY Left     TONSILLECTOMY      WISDOM TOOTH EXTRACTION       Family History   Problem Relation Age of Onset    Diabetes Mother     Breast cancer Neg Hx     Ovarian cancer Neg Hx     Uterine cancer Neg Hx     Cervical cancer Neg Hx     Colon cancer Neg Hx     Skin cancer Neg Hx     Malig Hyperthermia Neg Hx     Clotting disorder Neg Hx        Home Medications:  Prior to Admission medications    Medication Sig Start Date End Date Taking? Authorizing Provider   Prenatal Vit-Fe Fumarate-FA (prenatal vitamin 27-0.8) 27-0.8 MG tablet tablet Take 1 tablet by mouth Daily.    Provider, Historical, MD   brompheniramine-pseudoephedrine-DM 30-2-10 MG/5ML syrup Take 10 mL by mouth 4 (Four) Times a Day As Needed for Cough or Congestion.  Patient not taking: Reported on 12/1/2023 10/26/23  "12/14/23  Jessica Jay APRN   cephalexin (KEFLEX) 500 MG capsule Take 1 capsule by mouth 2 (Two) Times a Day. 12/1/23 12/14/23  Bambi Hill DO   nicotine (NICODERM CQ) 14 MG/24HR patch Place 1 patch on the skin as directed by provider Daily.  Patient not taking: Reported on 12/1/2023 1/16/23 12/14/23  Bridgett Chin APRN        Social History:   Social History     Tobacco Use    Smoking status: Never     Passive exposure: Never    Smokeless tobacco: Never   Vaping Use    Vaping Use: Every day    Substances: Nicotine, Flavoring    Devices: Disposable   Substance Use Topics    Alcohol use: Not Currently    Drug use: Never         Review of Systems:  Review of Systems   Constitutional: Negative.    HENT: Negative.     Eyes: Negative.    Respiratory: Negative.     Cardiovascular: Negative.    Gastrointestinal: Negative.    Endocrine: Negative.    Genitourinary:  Positive for pelvic pain and vaginal bleeding.   Musculoskeletal: Negative.    Skin: Negative.    Allergic/Immunologic: Negative.    Neurological: Negative.    Hematological: Negative.    Psychiatric/Behavioral: Negative.          Physical Exam:  /86 (BP Location: Right arm, Patient Position: Sitting)   Pulse 79   Temp 98.1 °F (36.7 °C) (Oral)   Resp 16   Ht 182.9 cm (72\")   Wt 105 kg (231 lb 7.7 oz)   LMP 10/22/2023 (Approximate)   SpO2 100%   BMI 31.39 kg/m²         Physical Exam  Vitals and nursing note reviewed. Exam conducted with a chaperone present (Lien Winslow RN).   Constitutional:       Appearance: Normal appearance. She is normal weight.   HENT:      Head: Normocephalic and atraumatic.      Nose: Nose normal.      Mouth/Throat:      Mouth: Mucous membranes are moist.   Eyes:      Extraocular Movements: Extraocular movements intact.      Conjunctiva/sclera: Conjunctivae normal.      Pupils: Pupils are equal, round, and reactive to light.   Cardiovascular:      Rate and Rhythm: Normal rate and regular rhythm.      Heart " sounds: Normal heart sounds.   Pulmonary:      Effort: Pulmonary effort is normal.      Breath sounds: Normal breath sounds.   Abdominal:      General: Abdomen is flat.      Palpations: Abdomen is soft.      Tenderness: There is no abdominal tenderness. There is no guarding or rebound.   Genitourinary:     General: Normal vulva.      Cervix: Discharge (slimy clear), friability and erythema present. No cervical bleeding.      Comments: Dried small amount of blood in the vaginal vanal, no clots    Musculoskeletal:         General: Normal range of motion.      Cervical back: Normal range of motion and neck supple.   Skin:     General: Skin is warm and dry.   Neurological:      General: No focal deficit present.      Mental Status: She is alert and oriented to person, place, and time.   Psychiatric:         Mood and Affect: Mood normal.         Behavior: Behavior normal.                   Procedures:  Procedures      Medical Decision Making:      Comorbidities that affect care:    Pregnancy    External Notes reviewed:    Previous Clinic Note: Urgent care visit from 2023 for COVID      The following orders were placed and all results were independently analyzed by me:  Orders Placed This Encounter   Procedures    US Ob < 14 Weeks Single or First Gestation    Comprehensive Metabolic Panel    hCG, Quantitative, Pregnancy    CBC Auto Differential    Supplies To Bedside - Notify MD When Ready- Pelvic Cart / Set Up     RhIg Evaluation    CBC & Differential       Medications Given in the Emergency Department:  Medications   ondansetron ODT (ZOFRAN-ODT) disintegrating tablet 4 mg (4 mg Oral Given 23 1139)   acetaminophen (TYLENOL) tablet 975 mg (975 mg Oral Given 23 1139)        ED Course:    The patient was initially evaluated in the triage area where orders were placed. The patient was later dispositioned by Hesham Chu PA-C.      The patient was advised to stay for completion of  workup which includes but is not limited to communication of labs and radiological results, reassessment and plan. The patient was advised that leaving prior to disposition by a provider could result in critical findings that are not communicated to the patient.     ED Course as of 12/14/23 1443   Thu Dec 14, 2023   1441 RN states that patient states she was going to leave.  RN spoke to the patient about staying until her ultrasound results.  She then got up and left.  We will chart the patient out of the low. [AJ]   1441 Ultrasound shows  Live IUP measuring 7 weeks 5 days with a fetal heart rate of 157 beats per minute,  With a right ovarian cyst.   [AJ]      ED Course User Index  [AJ] Hesham Chu, DARNELL       Labs:    Lab Results (last 24 hours)       Procedure Component Value Units Date/Time    CBC & Differential [[id number]]  (Normal) Collected: 12/14/23 1133    Specimen: Blood Updated: 12/14/23 1137    Narrative:      The following orders were created for panel order CBC & Differential.  Procedure                               Abnormality         Status                     ---------                               -----------         ------                     CBC Auto Differential[[id number]]        Normal              Final result                 Please view results for these tests on the individual orders.    Comprehensive Metabolic Panel [[id number]]  (Abnormal) Collected: 12/14/23 1133    Specimen: Blood Updated: 12/14/23 1153     Glucose 92 mg/dL      BUN 6 mg/dL      Creatinine 0.59 mg/dL      Sodium 133 mmol/L      Potassium 3.8 mmol/L      Chloride 101 mmol/L      CO2 21.0 mmol/L      Calcium 9.1 mg/dL      Total Protein 7.6 g/dL      Albumin 3.9 g/dL      ALT (SGPT) 11 U/L      AST (SGOT) 17 U/L      Alkaline Phosphatase 71 U/L      Total Bilirubin 0.2 mg/dL      Globulin 3.7 gm/dL      A/G Ratio 1.1 g/dL      BUN/Creatinine Ratio 10.2     Anion Gap 11.0 mmol/L      eGFR 130.9 mL/min/1.73      Narrative:      GFR Normal >60  Chronic Kidney Disease <60  Kidney Failure <15      hCG, Quantitative, Pregnancy [[id number]] Collected: 12/14/23 1133    Specimen: Blood Updated: 12/14/23 1227     HCG Quantitative 61,654.00 mIU/mL     Narrative:      HCG Ranges by Gestational Age    Females - non-pregnant premenopausal   </= 1mIU/mL HCG  Females - postmenopausal               </= 7mIU/mL HCG    3 Weeks       5.4   -      72 mIU/mL  4 Weeks      10.2   -     708 mIU/mL  5 Weeks       217   -   8,245 mIU/mL  6 Weeks       152   -  32,177 mIU/mL  7 Weeks     4,059   - 153,767 mIU/mL  8 Weeks    31,366   - 149,094 mIU/mL  9 Weeks    59,109   - 135,901 mIU/mL  10 Weeks   44,186   - 170,409 mIU/mL  12 Weeks   27,107   - 201,615 mIU/mL  14 Weeks   24,302   -  93,646 mIU/mL  15 Weeks   12,540   -  69,747 mIU/mL  16 Weeks    8,904   -  55,332 mIU/mL  17 Weeks    8,240   -  51,793 mIU/mL  18 Weeks    9,649   -  55,271 mIU/mL      CBC Auto Differential [[id number]]  (Normal) Collected: 12/14/23 1133    Specimen: Blood Updated: 12/14/23 1137     WBC 10.24 10*3/mm3      RBC 4.67 10*6/mm3      Hemoglobin 12.7 g/dL      Hematocrit 38.7 %      MCV 82.9 fL      MCH 27.2 pg      MCHC 32.8 g/dL      RDW 13.1 %      RDW-SD 39.5 fl      MPV 9.6 fL      Platelets 257 10*3/mm3      Neutrophil % 67.5 %      Lymphocyte % 23.6 %      Monocyte % 6.6 %      Eosinophil % 1.6 %      Basophil % 0.4 %      Immature Grans % 0.3 %      Neutrophils, Absolute 6.91 10*3/mm3      Lymphocytes, Absolute 2.42 10*3/mm3      Monocytes, Absolute 0.68 10*3/mm3      Eosinophils, Absolute 0.16 10*3/mm3      Basophils, Absolute 0.04 10*3/mm3      Immature Grans, Absolute 0.03 10*3/mm3      nRBC 0.0 /100 WBC              Imaging:    US Ob < 14 Weeks Single or First Gestation    Result Date: 12/14/2023  PROCEDURE: US OB < 14 WEEKS SINGLE OR FIRST GESTATION  COMPARISON: [facility], US, US OB 14 + WEEKS SINGLE OR FIRST GESTATION, 10/29/2021, 14:10.   INDICATIONS: vaginal bleeding  TECHNIQUE: Transabdominal and endovaginal pelvic ultrasound examinations were performed.   FINDINGS:  THE UTERUS MEASURES 10 X 5.6 X 6.9 CM.  THERE IS A LIVE IUP WITH A FETAL POLE MEASURING 1.4 CM.  A YOLK SAC IS PRESENT.  THE FETAL HEART RATE  BEATS PER MINUTE.  THE ESTIMATED GESTATIONAL AGE IS 7 WEEKS 5 DAYS.  THE RIGHT OVARY MEASURES 3.6 X 1.9 X 2.8 CM.  THE LEFT OVARY MEASURES 2.7 X 1.6 X 2.9 CM.  THERE IS A CORPUS LUTEAL CYST ON THE RIGHT.         1. Live IUP measuring 7 weeks 5 days with a fetal heart rate of 157 beats per minute.     MARTIN TOBAR MD       Electronically Signed and Approved By: MARTIN TOBAR MD on 12/14/2023 at 14:33                Differential Diagnosis and Discussion:      Pelvic Pain: Differential diagnosis includes but is not limited to ectopic pregnancy, ovarian torsion, tubo-ovarian abscess, ovarian cyst, ovulation, oophoritis, abdominal pregnancy, appendicitis, diverticulitis, cystitis, and renal colic  Vaginal Bleeding: Differential diagnosis includes but is not limited to foreign body, tumor, vaginitis, dysfunctional uterine bleeding, endocrine abnormalities, coagulation disorder, systemic illness, polyps, complications of pregnancy (possible ectopic pregnancy).    All labs were reviewed and interpreted by me.  Ultrasound impression was interpreted by me.     MDM     Amount and/or Complexity of Data Reviewed  Clinical lab tests: reviewed  Decide to obtain previous medical records or to obtain history from someone other than the patient: yes                 Patient Care Considerations:    CONSULT: I considered consulting hospitalist OB/GYN, however patient eloped.  Her ultrasound shows she has a living IUP with fetal heart tones noted.  Also shows a right ovarian cyst.  No concern for ectopic.      Consultants/Shared Management Plan:    None    Social Determinants of Health:    Patient is independent, reliable, and has access to care.        Disposition and Care Coordination:    Eloped: This patient has left the emergency department or waiting room with no communication to myself, nursing or administrative staff. There was no opportunity to discuss the patient's decision to leave, provide medical advice or discuss alternatives to. The staff has made efforts to locate patient without success.        Final diagnoses:   None        ED Disposition       ED Disposition   Eloped    Condition   --    Comment   --               This medical record created using voice recognition software.             Hesham Chu PA-C  12/14/23 8809

## 2023-12-14 NOTE — ED NOTES
Pt stated she didn't want to wait any longer for US results and stated she would check her my chart and come back if she had other concerns or increased bleeding.

## 2023-12-18 ENCOUNTER — TELEPHONE (OUTPATIENT)
Dept: OBSTETRICS AND GYNECOLOGY | Facility: CLINIC | Age: 23
End: 2023-12-18
Payer: COMMERCIAL

## 2023-12-18 NOTE — PROGRESS NOTES
Routine Prenatal Visit     Subjective  Nayla Rodriguez is a 23 y.o.  at 8w2d here for her routine OB visit.   She is taking her prenatal vitamins.Reports no loss of fluid or vaginal bleeding. Patient doing well without any complaints. Pregnancy complicated by:     Patient Active Problem List   Diagnosis    Depression    Seasonal allergic rhinitis    Tear of medial cartilage or meniscus of knee, current    Supervision of other normal pregnancy, antepartum         OB History    Para Term  AB Living   2 1 1 0 0 1   SAB IAB Ectopic Molar Multiple Live Births   0 0 0 0   1      # Outcome Date GA Lbr Mateusz/2nd Weight Sex Delivery Anes PTL Lv   2 Current            1 Term 21 37w3d 02:54 / 00:10 2930 g (6 lb 7.4 oz) M Vag-Spont None N RADHA      Complications: Precipitant delivery       ROS:   General ROS: negative for - chills or fatigue  Respiratory ROS: negative for - cough or hemoptysis  Cardiovascular ROS: negative for - chest pain or dyspnea on exertion  Genito-Urinary ROS: negative for  change in urinary stream, vaginal discharge   Musculoskeletal ROS: negative for - gait disturbance or joint pain  Dermatological ROS: negative for acne,  dry skin or itching      US Ob < 14 Weeks Single or First Gestation (2023 14:10)      Objective  Physical Exam:   Vitals:    23 0907   BP: 123/80       Uterine Size: not examined, US today  FHT: 110-160 BPM    General appearance - alert, well appearing, and in no distress  Mental status - alert, oriented to person, place, and time  Abdomen- soft, gravid uterus, non-tender to palpation  Musculoskeletal: negative for - gait disturbance or joint pain  Extremeties: negative swelling or cyanosis   Dermatological: negative rashes or skin lesions       Assessment/Plan  Diagnoses and all orders for this visit:    1. Supervision of other normal pregnancy, antepartum (Primary)  Assessment & Plan:  IKE finalized: 2024  Dating US consistent with  LMP    Genetic testing (NIPS-Quad)/CF/AFP:  discussed,will order at 10wk    COVID: recommended  Flu: recommended  Tdap: 27-36 weeks  RSV: 32-36 weeks    Rhogam:A pos  ?Sterilization:    Anatomy US:  FU US:    PROBLEM LIST/PLAN:     Orders:  -     POC Urinalysis Dipstick  -     Urine Culture - Urine, Urine, Clean Catch  -     OB Panel With HIV            Counseling:   First trimester precautions, bleeding, cramping, nausea and vomiting  Round Ligament Pain:  The uterus has several ligaments which provide support and keep the uterus in place. As the  uterus grows these ligaments are pulled and stretched which often causes sharp stabbing like pain in the inguinal area.   You may find a pregnancy support band helpful. Changing positions may also help. Yoga is a great way to cope with round ligament and low back pain in pregnancy.    Massage may also help with low back pain   Things to Consider at this Point in your Pregnancy:  Some women experience swelling in their feet during pregnancy. Compression stockings may help  Drink plenty of water and stay active   Make sure you are eating frequent small meals, nuts are a wonderful snack to keep with you            Return in about 4 weeks (around 1/16/2024) for Routine OB visit.      We have gone over prenatal care to include the timing and content of visits. I informed her how to contact the office and/or on call person in the event of any problems and encouraged her to do so when she feels it is necessary.  We then spent time answering her questions which she indicated were answered to her satisfaction.    Bambi Hill, DO  12/19/2023 10:00 EST

## 2023-12-18 NOTE — TELEPHONE ENCOUNTER
Per Dr. Hill, pt no longer needs this ultrasound since she had one done on 12/14/23 at [facility].  I have called the patient and made her aware and told her to keep her ob follow up appointment at 9:15 am on 12/19/23.  The pt voiced understanding of this and that we are cancelling the ultrasound appointment at 8:30 am.

## 2023-12-19 ENCOUNTER — ROUTINE PRENATAL (OUTPATIENT)
Dept: OBSTETRICS AND GYNECOLOGY | Facility: CLINIC | Age: 23
End: 2023-12-19
Payer: COMMERCIAL

## 2023-12-19 VITALS — SYSTOLIC BLOOD PRESSURE: 123 MMHG | BODY MASS INDEX: 32.28 KG/M2 | WEIGHT: 238 LBS | DIASTOLIC BLOOD PRESSURE: 80 MMHG

## 2023-12-19 DIAGNOSIS — Z34.80 SUPERVISION OF OTHER NORMAL PREGNANCY, ANTEPARTUM: Primary | ICD-10-CM

## 2023-12-19 LAB
ABO GROUP BLD: NORMAL
BASOPHILS # BLD AUTO: 0.02 10*3/MM3 (ref 0–0.2)
BASOPHILS NFR BLD AUTO: 0.3 % (ref 0–1.5)
BLD GP AB SCN SERPL QL: NEGATIVE
DEPRECATED RDW RBC AUTO: 38 FL (ref 37–54)
EOSINOPHIL # BLD AUTO: 0.08 10*3/MM3 (ref 0–0.4)
EOSINOPHIL NFR BLD AUTO: 1.1 % (ref 0.3–6.2)
ERYTHROCYTE [DISTWIDTH] IN BLOOD BY AUTOMATED COUNT: 13 % (ref 12.3–15.4)
GLUCOSE UR STRIP-MCNC: NEGATIVE MG/DL
HBV SURFACE AG SERPL QL IA: NORMAL
HCT VFR BLD AUTO: 38.4 % (ref 34–46.6)
HCV AB SER DONR QL: NORMAL
HGB BLD-MCNC: 12.3 G/DL (ref 12–15.9)
HIV1+2 AB SER QL: NORMAL
IMM GRANULOCYTES # BLD AUTO: 0.04 10*3/MM3 (ref 0–0.05)
IMM GRANULOCYTES NFR BLD AUTO: 0.5 % (ref 0–0.5)
LYMPHOCYTES # BLD AUTO: 1.78 10*3/MM3 (ref 0.7–3.1)
LYMPHOCYTES NFR BLD AUTO: 23.6 % (ref 19.6–45.3)
MCH RBC QN AUTO: 25.9 PG (ref 26.6–33)
MCHC RBC AUTO-ENTMCNC: 32 G/DL (ref 31.5–35.7)
MCV RBC AUTO: 81 FL (ref 79–97)
MONOCYTES # BLD AUTO: 0.43 10*3/MM3 (ref 0.1–0.9)
MONOCYTES NFR BLD AUTO: 5.7 % (ref 5–12)
NEUTROPHILS NFR BLD AUTO: 5.18 10*3/MM3 (ref 1.7–7)
NEUTROPHILS NFR BLD AUTO: 68.8 % (ref 42.7–76)
NRBC BLD AUTO-RTO: 0 /100 WBC (ref 0–0.2)
PLATELET # BLD AUTO: 270 10*3/MM3 (ref 140–450)
PMV BLD AUTO: 10.2 FL (ref 6–12)
PROT UR STRIP-MCNC: NEGATIVE MG/DL
RBC # BLD AUTO: 4.74 10*6/MM3 (ref 3.77–5.28)
RH BLD: POSITIVE
T PALLIDUM IGG SER QL: NORMAL
WBC NRBC COR # BLD AUTO: 7.53 10*3/MM3 (ref 3.4–10.8)

## 2023-12-19 PROCEDURE — 86803 HEPATITIS C AB TEST: CPT | Performed by: STUDENT IN AN ORGANIZED HEALTH CARE EDUCATION/TRAINING PROGRAM

## 2023-12-19 PROCEDURE — G0432 EIA HIV-1/HIV-2 SCREEN: HCPCS | Performed by: STUDENT IN AN ORGANIZED HEALTH CARE EDUCATION/TRAINING PROGRAM

## 2023-12-19 PROCEDURE — 87086 URINE CULTURE/COLONY COUNT: CPT | Performed by: STUDENT IN AN ORGANIZED HEALTH CARE EDUCATION/TRAINING PROGRAM

## 2023-12-19 PROCEDURE — 87340 HEPATITIS B SURFACE AG IA: CPT | Performed by: STUDENT IN AN ORGANIZED HEALTH CARE EDUCATION/TRAINING PROGRAM

## 2023-12-19 PROCEDURE — 86900 BLOOD TYPING SEROLOGIC ABO: CPT | Performed by: STUDENT IN AN ORGANIZED HEALTH CARE EDUCATION/TRAINING PROGRAM

## 2023-12-19 PROCEDURE — 85025 COMPLETE CBC W/AUTO DIFF WBC: CPT | Performed by: STUDENT IN AN ORGANIZED HEALTH CARE EDUCATION/TRAINING PROGRAM

## 2023-12-19 PROCEDURE — 86780 TREPONEMA PALLIDUM: CPT | Performed by: STUDENT IN AN ORGANIZED HEALTH CARE EDUCATION/TRAINING PROGRAM

## 2023-12-19 PROCEDURE — 86850 RBC ANTIBODY SCREEN: CPT | Performed by: STUDENT IN AN ORGANIZED HEALTH CARE EDUCATION/TRAINING PROGRAM

## 2023-12-19 PROCEDURE — 86901 BLOOD TYPING SEROLOGIC RH(D): CPT | Performed by: STUDENT IN AN ORGANIZED HEALTH CARE EDUCATION/TRAINING PROGRAM

## 2023-12-19 NOTE — ASSESSMENT & PLAN NOTE
IKE finalized: 7/28/2024  Dating US consistent with LMP    Genetic testing (NIPS-Quad)/CF/AFP:  discussed,will order at 10wk    COVID: recommended  Flu: recommended  Tdap: 27-36 weeks  RSV: 32-36 weeks    Rhogam:A pos  ?Sterilization:    Anatomy US:  FU US:    PROBLEM LIST/PLAN:

## 2023-12-19 NOTE — PATIENT INSTRUCTIONS
Venipuncture Blood Specimen Collection  Venipuncture performed in left arm by Ivelisse Anderson with good hemostasis. Patient tolerated the procedure well without complications.   12/19/23   Ivelisse Anderson

## 2023-12-20 LAB
BACTERIA SPEC AEROBE CULT: NO GROWTH
HGB A MFR BLD ELPH: 97.7 % (ref 96.4–98.8)
HGB A2 MFR BLD ELPH: 2.3 % (ref 1.8–3.2)
HGB F MFR BLD ELPH: 0 % (ref 0–2)
HGB FRACT BLD-IMP: NORMAL
HGB S MFR BLD ELPH: 0 %
RUBV IGG SERPL IA-ACNC: 6.68 INDEX

## 2024-01-02 ENCOUNTER — PATIENT MESSAGE (OUTPATIENT)
Dept: OBSTETRICS AND GYNECOLOGY | Facility: CLINIC | Age: 24
End: 2024-01-02
Payer: COMMERCIAL

## 2024-01-02 DIAGNOSIS — R11.2 NAUSEA AND VOMITING, UNSPECIFIED VOMITING TYPE: Primary | ICD-10-CM

## 2024-01-02 RX ORDER — ONDANSETRON 4 MG/1
4 TABLET, FILM COATED ORAL DAILY PRN
Qty: 30 TABLET | Refills: 1 | Status: SHIPPED | OUTPATIENT
Start: 2024-01-02 | End: 2025-01-01

## 2024-01-12 NOTE — PROGRESS NOTES
Routine Prenatal Visit     Subjective  Nayla Rodriguez is a 23 y.o.  at 12w4d here for her routine OB visit.   She is taking her prenatal vitamins.Reports no loss of fluid or vaginal bleeding. Patient doing well without any complaints. Pregnancy complicated by:     Patient Active Problem List   Diagnosis    Depression    Seasonal allergic rhinitis    Tear of medial cartilage or meniscus of knee, current    Supervision of other normal pregnancy, antepartum         OB History    Para Term  AB Living   2 1 1 0 0 1   SAB IAB Ectopic Molar Multiple Live Births   0 0 0 0   1      # Outcome Date GA Lbr Amteusz/2nd Weight Sex Delivery Anes PTL Lv   2 Current            1 Term 21 37w3d 02:54 / 00:10 2930 g (6 lb 7.4 oz) M Vag-Spont None N RADHA      Complications: Precipitant delivery       ROS:   General ROS: negative for - chills or fatigue  Respiratory ROS: negative for - cough or hemoptysis  Cardiovascular ROS: negative for - chest pain or dyspnea on exertion  Genito-Urinary ROS: negative for  change in urinary stream, vaginal discharge   Musculoskeletal ROS: negative for - gait disturbance or joint pain  Dermatological ROS: negative for acne,  dry skin or itching    Objective  Physical Exam:   Vitals:    24 0852   BP: 136/83       Uterine Size: size equals dates  FHT: 110-160 BPM    General appearance - alert, well appearing, and in no distress  Mental status - alert, oriented to person, place, and time  Abdomen- soft, gravid uterus, non-tender to palpation  Musculoskeletal: negative for - gait disturbance or joint pain  Extremeties: negative swelling or cyanosis   Dermatological: negative rashes or skin lesions       Assessment/Plan  Diagnoses and all orders for this visit:    1. Supervision of other normal pregnancy, antepartum (Primary)  Assessment & Plan:  IKE finalized: 2024  Dating US consistent with LMP    Genetic testing (NIPS-Quad)/CF/AFP:  discussed, checking with  insurance    COVID: recommended  Flu: recommended  Tdap: 27-36 weeks  RSV: 32-36 weeks    Rhogam:A pos  ?Sterilization:    Anatomy US:  FU US:    PROBLEM LIST/PLAN:     Orders:  -     POC Urinalysis Dipstick    2. Nausea without vomiting  -     promethazine (PHENERGAN) 25 MG tablet; Take 1 tablet by mouth Every 6 (Six) Hours As Needed for Nausea or Vomiting.  Dispense: 30 tablet; Refill: 1            Counseling:   Second trimester precautions  Round Ligament Pain:  The uterus has several ligaments which provide support and keep the uterus in place. As the  uterus grows these ligaments are pulled and stretched which often causes sharp stabbing like pain in the inguinal area.   You may find a pregnancy support band helpful. Changing positions may also help. Yoga is a great way to cope with round ligament and low back pain in pregnancy.    Massage may also help with low back pain   Things to Consider at this Point in your Pregnancy:  Some women experience swelling in their feet during pregnancy. Compression stockings may help  Drink plenty of water and stay active   Make sure you are eating frequent small meals, nuts are a wonderful snack to keep with you            Return in about 4 weeks (around 2/15/2024) for Routine OB visit.      We have gone over prenatal care to include the timing and content of visits. I informed her how to contact the office and/or on call person in the event of any problems and encouraged her to do so when she feels it is necessary.  We then spent time answering her questions which she indicated were answered to her satisfaction.    Bambi Hill, DO  1/18/2024 09:30 EST

## 2024-01-18 ENCOUNTER — ROUTINE PRENATAL (OUTPATIENT)
Dept: OBSTETRICS AND GYNECOLOGY | Facility: CLINIC | Age: 24
End: 2024-01-18
Payer: COMMERCIAL

## 2024-01-18 ENCOUNTER — PATIENT OUTREACH (OUTPATIENT)
Dept: LABOR AND DELIVERY | Facility: HOSPITAL | Age: 24
End: 2024-01-18
Payer: COMMERCIAL

## 2024-01-18 ENCOUNTER — REFERRAL TRIAGE (OUTPATIENT)
Dept: LABOR AND DELIVERY | Facility: HOSPITAL | Age: 24
End: 2024-01-18
Payer: COMMERCIAL

## 2024-01-18 ENCOUNTER — LAB (OUTPATIENT)
Dept: OBSTETRICS AND GYNECOLOGY | Facility: CLINIC | Age: 24
End: 2024-01-18
Payer: COMMERCIAL

## 2024-01-18 VITALS — DIASTOLIC BLOOD PRESSURE: 83 MMHG | WEIGHT: 237 LBS | SYSTOLIC BLOOD PRESSURE: 136 MMHG | BODY MASS INDEX: 32.14 KG/M2

## 2024-01-18 DIAGNOSIS — Z34.80 SUPERVISION OF OTHER NORMAL PREGNANCY, ANTEPARTUM: ICD-10-CM

## 2024-01-18 DIAGNOSIS — Z13.71 SCREENING FOR GENETIC DISEASE CARRIER STATUS: ICD-10-CM

## 2024-01-18 DIAGNOSIS — R11.0 NAUSEA WITHOUT VOMITING: ICD-10-CM

## 2024-01-18 DIAGNOSIS — Z13.79 ENCOUNTER FOR GENETIC SCREENING FOR BIRTH DEFECT: Primary | ICD-10-CM

## 2024-01-18 LAB
GLUCOSE UR STRIP-MCNC: NEGATIVE MG/DL
PROT UR STRIP-MCNC: NEGATIVE MG/DL

## 2024-01-18 RX ORDER — PROMETHAZINE HYDROCHLORIDE 25 MG/1
25 TABLET ORAL EVERY 6 HOURS PRN
Qty: 30 TABLET | Refills: 1 | Status: SHIPPED | OUTPATIENT
Start: 2024-01-18

## 2024-01-18 NOTE — ASSESSMENT & PLAN NOTE
IKE finalized: 7/28/2024  Dating US consistent with LMP    Genetic testing (NIPS-Quad)/CF/AFP:  discussed, checking with insurance    COVID: recommended  Flu: recommended  Tdap: 27-36 weeks  RSV: 32-36 weeks    Rhogam:A pos  ?Sterilization:    Anatomy US:  FU US:    PROBLEM LIST/PLAN:

## 2024-01-18 NOTE — OUTREACH NOTE
[facility]  Enrollment    Current Estimated Gestational Age: 12w4d    Questions/Answers      Flowsheet Row Responses   Would like to participate? Yes          Intake Assessment      Flowsheet Row Responses   Best Method for Contacting Cell   Currently Employed Yes   Able to keep appointments as scheduled Yes   Resources Presently Utilizing: WIC (Women, Infant, Children)   Maternal Warning Signs Provided   Other: Provided   Other Education WIC Benefits, Insurance benefits/Incentives, HANDS            Learning Assessment      Flowsheet Row Responses   Relationship Patient   Does the learner have any barriers to learning? No Barriers   What is the preferred language of the learner for medical teaching? English   Is an  required? No            Met with patient at Ob office visit. Doing well. Encouraged to reach out for any questions, needs or concerns.       Tobacco, Alcohol, and Drug History     reports that she has never smoked. She has never been exposed to tobacco smoke. She has never used smokeless tobacco.   reports that she does not currently use alcohol.   reports no history of drug use.    Sonia Mitchell RN  Maternity Nurse Navigator    1/18/2024, 09:34 EST

## 2024-01-18 NOTE — PATIENT INSTRUCTIONS
Venipuncture Blood Specimen Collection  Venipuncture performed in left arm by Ivelisse Anderson with good hemostasis. Patient tolerated the procedure well without complications.   01/18/24   Ivelisse Anderson

## 2024-02-01 LAB
CITATION REF LAB TEST: ABNORMAL
CLINICAL INFO: ABNORMAL
ETHNIC BACKGROUND STATED: ABNORMAL
GENE DIS ANL CARRIER INTERP-IMP: ABNORMAL
GENE STUDIED ID: ABNORMAL
LAB DIRECTOR NAME PROVIDER: ABNORMAL
Lab: ABNORMAL
MOL DX INTERP BLD/T QL: ABNORMAL
REASON FOR REFERRAL (NARRATIVE): ABNORMAL
RECOMMENDATION PATIENT DOC-IMP: ABNORMAL
REF LAB TEST METHOD: ABNORMAL
SERVICE CMNT-IMP: ABNORMAL
SPECIMEN SOURCE: ABNORMAL

## 2024-02-05 ENCOUNTER — TELEPHONE (OUTPATIENT)
Dept: OBSTETRICS AND GYNECOLOGY | Facility: CLINIC | Age: 24
End: 2024-02-05
Payer: COMMERCIAL

## 2024-02-05 NOTE — TELEPHONE ENCOUNTER
----- Message from Bambi Hill DO sent at 2/5/2024  1:59 PM EST -----  CF neg  SMA unable to be analysed. Recommend redraw but its patient's decision

## 2024-02-06 ENCOUNTER — PATIENT OUTREACH (OUTPATIENT)
Dept: LABOR AND DELIVERY | Facility: HOSPITAL | Age: 24
End: 2024-02-06
Payer: COMMERCIAL

## 2024-02-06 NOTE — OUTREACH NOTE
[facility]  Check-In    Current Estimated Gestational Age: 15w2d        [facility] second trimester information sent.      Sonia Mitchell RN  Maternity Nurse Navigator    2/6/2024, 09:41 EST

## 2024-02-07 ENCOUNTER — TELEPHONE (OUTPATIENT)
Dept: OBSTETRICS AND GYNECOLOGY | Facility: CLINIC | Age: 24
End: 2024-02-07
Payer: COMMERCIAL

## 2024-02-19 ENCOUNTER — TELEPHONE (OUTPATIENT)
Dept: OBSTETRICS AND GYNECOLOGY | Facility: CLINIC | Age: 24
End: 2024-02-19
Payer: COMMERCIAL

## 2024-03-04 ENCOUNTER — ROUTINE PRENATAL (OUTPATIENT)
Dept: OBSTETRICS AND GYNECOLOGY | Facility: CLINIC | Age: 24
End: 2024-03-04
Payer: COMMERCIAL

## 2024-03-04 VITALS — BODY MASS INDEX: 32.55 KG/M2 | WEIGHT: 240 LBS | SYSTOLIC BLOOD PRESSURE: 122 MMHG | DIASTOLIC BLOOD PRESSURE: 79 MMHG

## 2024-03-04 DIAGNOSIS — Z34.80 SUPERVISION OF OTHER NORMAL PREGNANCY, ANTEPARTUM: Primary | ICD-10-CM

## 2024-03-04 LAB
GLUCOSE UR STRIP-MCNC: NEGATIVE MG/DL
PROT UR STRIP-MCNC: NEGATIVE MG/DL

## 2024-03-04 PROCEDURE — 99213 OFFICE O/P EST LOW 20 MIN: CPT | Performed by: NURSE PRACTITIONER

## 2024-03-04 NOTE — PROGRESS NOTES
OB FOLLOW UP      Chief Complaint   Patient presents with    Routine Prenatal Visit     Subjective:   No complaints    Objective:  /79   Wt 109 kg (240 lb)   LMP 10/22/2023 (Approximate)   BMI 32.55 kg/m²  4.536 kg (10 lb)  Uterine Size: size equals dates, below umbilicus  FHT: 110-160 BPM    See OB flow for edema, cvx exam if performed, and Upro/Uglu    Assessment and Plan:  19w1d  Reassuring pregnancy progress.  Questions answered.  Diagnoses and all orders for this visit:    1. Supervision of other normal pregnancy, antepartum (Primary)  Overview:  IKE finalized: 7/28/2024  Dating US consistent with LMP    Genetic testing (NIPS-Quad)/CF/AFP:  NIPS neg, CF neg/SMA pending, AFP pending    COVID: recommended  Flu: recommended  Tdap: 27-36 weeks  RSV: 32-36 weeks    Rhogam:A pos  ?Sterilization:    Anatomy US:  FU US:    PROBLEM LIST/PLAN:            Assessment & Plan:  Doing well, no complaints  SMA redrawn, AFP pending  Anatomy scan ordered  1hGTT next visit  Second trimester precautions    Orders:  -     POC Urinalysis Dipstick  -     Alpha Fetoprotein, Maternal  -     US Ob Detail Fetal Anatomy Single or First Gestation; Future      Counseling:    Second trimester precautions  Continue PNV.  Importance of healthy eating and staying active.    Return in about 5 weeks (around 4/8/2024) for OB follow up, 1hGTT.        Ryan Coronado, APRN  03/04/2024    [facility] [place]  [facility]  [address] [place] [address]  Dept: [phone]  Dept Fax: [phone]  Loc: [phone]

## 2024-03-04 NOTE — ASSESSMENT & PLAN NOTE
Doing well, no complaints  SMA redrawn, AFP pending  Anatomy scan ordered  1hGTT next visit  Second trimester precautions

## 2024-03-11 ENCOUNTER — TELEPHONE (OUTPATIENT)
Dept: OBSTETRICS AND GYNECOLOGY | Facility: CLINIC | Age: 24
End: 2024-03-11
Payer: COMMERCIAL

## 2024-03-11 LAB
AFP INTERP SERPL-IMP: NORMAL
AFP INTERP SERPL-IMP: NORMAL
AFP MOM SERPL: 0.91
AFP SERPL-MCNC: 34.5 NG/ML
AGE AT DELIVERY: 23.1 YR
GA METHOD: NORMAL
GA: 19 WEEKS
IDDM PATIENT QL: NORMAL
LABORATORY COMMENT REPORT: NORMAL
MULTIPLE PREGNANCY: NORMAL
NEURAL TUBE DEFECT RISK FETUS: NORMAL %
RESULT: NORMAL

## 2024-03-11 NOTE — TELEPHONE ENCOUNTER
----- Message from CHRIS Jung sent at 3/11/2024 10:01 AM EDT -----  Please let patient know her screen for increased risk of neural tube defects is negative.

## 2024-03-18 ENCOUNTER — HOSPITAL ENCOUNTER (OUTPATIENT)
Dept: ULTRASOUND IMAGING | Facility: HOSPITAL | Age: 24
Discharge: HOME | End: 2024-03-18
Admitting: NURSE PRACTITIONER
Payer: COMMERCIAL

## 2024-03-18 DIAGNOSIS — Z34.80 SUPERVISION OF OTHER NORMAL PREGNANCY, ANTEPARTUM: ICD-10-CM

## 2024-03-18 PROCEDURE — 76811 OB US DETAILED SNGL FETUS: CPT

## 2024-03-25 ENCOUNTER — TELEPHONE (OUTPATIENT)
Dept: OBSTETRICS AND GYNECOLOGY | Facility: CLINIC | Age: 24
End: 2024-03-25
Payer: COMMERCIAL

## 2024-03-25 NOTE — TELEPHONE ENCOUNTER
----- Message from CHRIS Jung sent at 3/25/2024  8:49 AM EDT -----  Please let patient know [facility] was unable to analyze her repeat specimen to determine her SMA carrier status.  Repeat testing is not recommended.

## 2024-04-02 ENCOUNTER — TELEPHONE (OUTPATIENT)
Dept: OBSTETRICS AND GYNECOLOGY | Facility: CLINIC | Age: 24
End: 2024-04-02

## 2024-04-02 NOTE — TELEPHONE ENCOUNTER
Caller: Nayla Rodriguez    Relationship to patient: Self    Best call back number: [phone] (home)  CAN CALL BACK     OB PT NEEDS DENTAL LETTER FAXED TO [facility]   FAX# [phone] PT HAS AN APPT ON 04-@8AM.

## 2024-04-08 ENCOUNTER — ROUTINE PRENATAL (OUTPATIENT)
Dept: OBSTETRICS AND GYNECOLOGY | Facility: CLINIC | Age: 24
End: 2024-04-08
Payer: COMMERCIAL

## 2024-04-08 VITALS — DIASTOLIC BLOOD PRESSURE: 86 MMHG | WEIGHT: 230 LBS | SYSTOLIC BLOOD PRESSURE: 133 MMHG | BODY MASS INDEX: 31.19 KG/M2

## 2024-04-08 DIAGNOSIS — Z34.80 SUPERVISION OF OTHER NORMAL PREGNANCY, ANTEPARTUM: Primary | ICD-10-CM

## 2024-04-08 LAB
DEPRECATED RDW RBC AUTO: 43.8 FL (ref 37–54)
ERYTHROCYTE [DISTWIDTH] IN BLOOD BY AUTOMATED COUNT: 13.8 % (ref 12.3–15.4)
GLUCOSE 1H P GLC SERPL-MCNC: 89 MG/DL (ref 65–139)
GLUCOSE UR STRIP-MCNC: NEGATIVE MG/DL
HCT VFR BLD AUTO: 36.1 % (ref 34–46.6)
HGB BLD-MCNC: 11.6 G/DL (ref 12–15.9)
MCH RBC QN AUTO: 28 PG (ref 26.6–33)
MCHC RBC AUTO-ENTMCNC: 32.1 G/DL (ref 31.5–35.7)
MCV RBC AUTO: 87 FL (ref 79–97)
PLATELET # BLD AUTO: 257 10*3/MM3 (ref 140–450)
PMV BLD AUTO: 10.6 FL (ref 6–12)
PROT UR STRIP-MCNC: NEGATIVE MG/DL
RBC # BLD AUTO: 4.15 10*6/MM3 (ref 3.77–5.28)
T PALLIDUM IGG SER QL: NORMAL
WBC NRBC COR # BLD AUTO: 10.27 10*3/MM3 (ref 3.4–10.8)

## 2024-04-08 PROCEDURE — 86780 TREPONEMA PALLIDUM: CPT | Performed by: NURSE PRACTITIONER

## 2024-04-08 PROCEDURE — 82950 GLUCOSE TEST: CPT | Performed by: NURSE PRACTITIONER

## 2024-04-08 PROCEDURE — 99214 OFFICE O/P EST MOD 30 MIN: CPT | Performed by: NURSE PRACTITIONER

## 2024-04-08 PROCEDURE — 85027 COMPLETE CBC AUTOMATED: CPT | Performed by: NURSE PRACTITIONER

## 2024-04-08 NOTE — ASSESSMENT & PLAN NOTE
Doing well, no complaints  1hGTT/CBC/Tpa today  Fetal movement precautions  Second trimester precautions

## 2024-04-08 NOTE — PROGRESS NOTES
OB FOLLOW UP      Chief Complaint   Patient presents with    Routine Prenatal Visit     Subjective:   No complaints    Objective:  /86   Wt 104 kg (230 lb)   LMP 10/22/2023 (Approximate)   BMI 31.19 kg/m²  0 kg (0 lb)  Uterine Size: size equals dates  FHT: 110-160 BPM    See OB flow for edema, cvx exam if performed, and Upro/Uglu    Assessment and Plan:  24w1d  Reassuring pregnancy progress.  Questions answered.  Diagnoses and all orders for this visit:    1. Supervision of other normal pregnancy, antepartum (Primary)  Overview:  IKE finalized: 7/28/2024  Dating US consistent with LMP    Genetic testing (NIPS-Quad)/CF/AFP:  NIPS neg, CF neg/SMA pending, AFP negative    COVID: recommended  Flu: recommended  Tdap: 27-36 weeks  RSV: 32-36 weeks    Rhogam:A pos  ?Sterilization:    Anatomy US: 3/18/24 normal anatomy, anterior placenta,  g, 78%, AC 59%  FU US:    PROBLEM LIST/PLAN:            Assessment & Plan:  Doing well, no complaints  1hGTT/CBC/Tpa today  Fetal movement precautions  Second trimester precautions    Orders:  -     POC Urinalysis Dipstick  -     Gestational Diabetes Screen 1 Hour  -     CBC (No Diff)  -     T Pallidum Antibody w/ reflex RPR      Counseling:    Second trimester precautions  FKC  Continue PNV.  Importance of healthy eating and staying active.    Return in about 4 weeks (around 5/6/2024) for [facility], OB follow up.        Ryan Coronado, APRN  04/08/2024    [facility] [place]  [facility]  [address] [place] [address]  Dept: [phone]  Dept Fax: [phone]  Loc: [phone]

## 2024-05-13 PROBLEM — J30.2 SEASONAL ALLERGIC RHINITIS: Status: RESOLVED | Noted: 2022-01-12 | Resolved: 2024-05-13

## 2024-05-13 NOTE — PROGRESS NOTES
[facility]  OB Follow Up Visit    CC: Routine obstetrical visit    Prenatal care complicated by:  Patient Active Problem List   Diagnosis    Depression    Supervision of other normal pregnancy, antepartum    Heartburn during pregnancy, antepartum    Nausea and vomiting     Subjective:   Nayla Rodriguez is a 23 y.o.  29w1d patient being seen today for her obstetrical follow up visit. The patient has: No complaints, No leaking fluid, No vaginal bleeding, No contractions, Adequate FM  Reports having daily reflux.  Request medication to treat this.  I also request a refill on the Zofran which helps with her continued nausea and vomiting.    History: Past medical and surgical history, medications, allergies, social history, and obstetrical history all reviewed and updated.    Objective:    Urine glucose/protein - See OB flow sheet      /78   Wt 107 kg (236 lb)   LMP 10/22/2023 (Approximate)   BMI 32.01 kg/m²     General exam: Comfortable, NAD  FHR: 149 BPM   Uterine Size:  29 cm  Pelvic Exam: No    Assessment and Plan:  Diagnoses and all orders for this visit:    1. Supervision of other normal pregnancy, antepartum (Primary)  Overview:  IKE finalized: 2024  Dating US consistent with LMP    Genetic testing (NIPS-Quad)/CF/AFP:  NIPS neg, CF neg/SMA pending, AFP negative    COVID: recommended  Flu: recommended  Tdap: 27-36 weeks  RSV: 32-36 weeks    Anatomy US: 3/18/24 normal anatomy, anterior placenta,  g, 78%, AC 59%    Assessment & Plan:  1 hour GTT was normal  Continue prenatal vitamins  Fetal kick counts   labor warnings    Orders:  -     POC Urinalysis Dipstick    2. Nausea and vomiting, unspecified vomiting type  -     ondansetron (Zofran) 4 MG tablet; Take 1 tablet by mouth Every 8 (Eight) Hours As Needed for Nausea or Vomiting.  Dispense: 30 tablet; Refill: 2    3. Heartburn during pregnancy, antepartum  -     famotidine (Pepcid) 20 MG tablet; Take 1 tablet by  mouth 2 (Two) Times a Day.  Dispense: 60 tablet; Refill: 4      29w1d  Reassuring pregnancy progress    Counseling: OB precautions, leaking, VB, dwight quintanilla vs PTL/Labor  [facility]    Questions answered    Return in about 2 weeks (around 5/28/2024) for Recheck.    Lamberto Meade MD  05/14/2024

## 2024-05-14 ENCOUNTER — PATIENT OUTREACH (OUTPATIENT)
Dept: LABOR AND DELIVERY | Facility: HOSPITAL | Age: 24
End: 2024-05-14
Payer: COMMERCIAL

## 2024-05-14 ENCOUNTER — ROUTINE PRENATAL (OUTPATIENT)
Dept: OBSTETRICS AND GYNECOLOGY | Facility: CLINIC | Age: 24
End: 2024-05-14
Payer: COMMERCIAL

## 2024-05-14 VITALS — SYSTOLIC BLOOD PRESSURE: 119 MMHG | WEIGHT: 236 LBS | DIASTOLIC BLOOD PRESSURE: 78 MMHG | BODY MASS INDEX: 32.01 KG/M2

## 2024-05-14 DIAGNOSIS — O26.899 HEARTBURN DURING PREGNANCY, ANTEPARTUM: ICD-10-CM

## 2024-05-14 DIAGNOSIS — R12 HEARTBURN DURING PREGNANCY, ANTEPARTUM: ICD-10-CM

## 2024-05-14 DIAGNOSIS — Z34.80 SUPERVISION OF OTHER NORMAL PREGNANCY, ANTEPARTUM: Primary | ICD-10-CM

## 2024-05-14 DIAGNOSIS — R11.2 NAUSEA AND VOMITING, UNSPECIFIED VOMITING TYPE: ICD-10-CM

## 2024-05-14 LAB
GLUCOSE UR STRIP-MCNC: NEGATIVE MG/DL
PROT UR STRIP-MCNC: NEGATIVE MG/DL

## 2024-05-14 RX ORDER — ONDANSETRON 4 MG/1
4 TABLET, FILM COATED ORAL EVERY 8 HOURS PRN
Qty: 30 TABLET | Refills: 2 | Status: SHIPPED | OUTPATIENT
Start: 2024-05-14 | End: 2025-05-14

## 2024-05-14 RX ORDER — FAMOTIDINE 20 MG/1
20 TABLET, FILM COATED ORAL 2 TIMES DAILY
Qty: 60 TABLET | Refills: 4 | Status: SHIPPED | OUTPATIENT
Start: 2024-05-14 | End: 2025-05-14

## 2024-05-14 NOTE — OUTREACH NOTE
[facility]  Check-In    Current Estimated Gestational Age: 29w2d      Questions/Answers      Flowsheet Row Responses   Best Method for Contacting Cell   Demographics Reviewed Yes   Currently Employed Yes   Able to keep appointments as scheduled Yes   Gender(s) and Name(s) Boy   Baby Active/Feeling Fetal Movemen Yes   How are you presently feeling? Good   Supplies ready for baby Breast Pump, Clothing, Crib, Diapers, Feeding Supplies   Resource/Environmental Concerns None   Do you have any questions related to your care experience, your pregnancy, plans for delivery, any concerns, etc? No   Other Education Birth Plan, How to find a pediatrician            Pediatrician: Dr Itzel PEDRO: Sukumar  Feeding Plan: Breast feeding - ordered pump from [employer]    No current concerns with food, housing or transportation.  Encouraged to reach out for any questions, needs or concerns.      Sonia Mitchell RN  Maternity Nurse Navigator    5/14/2024, 10:50 EDT

## 2024-05-15 ENCOUNTER — TELEPHONE (OUTPATIENT)
Dept: OBSTETRICS AND GYNECOLOGY | Facility: CLINIC | Age: 24
End: 2024-05-15
Payer: COMMERCIAL

## 2024-05-28 PROCEDURE — 87086 URINE CULTURE/COLONY COUNT: CPT | Performed by: PHYSICIAN ASSISTANT

## 2024-05-30 NOTE — PROGRESS NOTES
OB FOLLOW UP      Chief Complaint   Patient presents with    Routine Prenatal Visit     Pt has some leg cramps at night. Pt wants to know if there is going to be another US before baby comes.      Subjective:   New patient to me  No complaints    Objective:  /84   Wt 108 kg (239 lb)   LMP 10/22/2023 (Approximate)   BMI 32.41 kg/m²  4.082 kg (9 lb) Facility age limit for growth %mathew is 20 years.  See OB flow sheet for fundal height (not performed if US day of OV), FHT, edema, cvx exam if performed, and Upro/Uglu  Chaperone present during pelvic exam if performed.      Assessment and Plan:  32w2d     Diagnoses and all orders for this visit:    1. Supervision of other normal pregnancy, antepartum (Primary)  Overview:  IKE finalized: 7/28/2024 by LMP and 7week US    NIPS neg XY, CF neg/SMA failed x2, AFP negative    COVID: recommended  Tdap: recommended, s/p Rx 6/4/2024     ?Sterilization:  Declines 6/4/2024   FU TPA 28-32weeks: 6/4/2024     Anatomy US: 3/18/24 normal anatomy, anterior placenta,  g, 78%, AC 59%  FU US:    PROBLEM LIST/PLAN:   Anxiety/depression-no medications.  Stable  History of precipitous delivery w G1- consider IOL at 39+- pt desires    Orders:  -     POC Urinalysis Dipstick  -     RPR, Rfx Qn RPR / Confirm TP      Counseling:    OB precautions, leaking, VB, dwight quintanilla vs PTL/Labor  FKC  Discussed US FU would be as needed prn concerns in pregnancy    Reassuring pregnancy progress. Questions answered.  Continue PNV.  Importance of healthy eating, obtaining sufficient sleep, and staying active unless hypertensive- activity modified as directed.    Return in about 2 weeks (around 6/18/2024) for FU OB q2wks to 36weeks then weekly to IKE.            Wendi Neal, DO  06/04/2024    [facility]  [address] [place] [address]  Dept: [phone]  Dept Fax: [phone]  Loc: [phone]  Loc Fax: [phone]

## 2024-06-04 ENCOUNTER — ROUTINE PRENATAL (OUTPATIENT)
Dept: OBSTETRICS AND GYNECOLOGY | Facility: CLINIC | Age: 24
End: 2024-06-04
Payer: COMMERCIAL

## 2024-06-04 VITALS — DIASTOLIC BLOOD PRESSURE: 84 MMHG | BODY MASS INDEX: 32.41 KG/M2 | WEIGHT: 239 LBS | SYSTOLIC BLOOD PRESSURE: 126 MMHG

## 2024-06-04 DIAGNOSIS — Z34.80 SUPERVISION OF OTHER NORMAL PREGNANCY, ANTEPARTUM: Primary | ICD-10-CM

## 2024-06-04 LAB
GLUCOSE UR STRIP-MCNC: NEGATIVE MG/DL
PROT UR STRIP-MCNC: NEGATIVE MG/DL

## 2024-06-04 PROCEDURE — 86592 SYPHILIS TEST NON-TREP QUAL: CPT | Performed by: OBSTETRICS & GYNECOLOGY

## 2024-06-06 LAB — RPR SER QL: NON REACTIVE

## 2024-06-16 ENCOUNTER — HOSPITAL ENCOUNTER (OUTPATIENT)
Facility: HOSPITAL | Age: 24
Discharge: HOME | End: 2024-06-16
Attending: OBSTETRICS & GYNECOLOGY | Admitting: OBSTETRICS & GYNECOLOGY
Payer: COMMERCIAL

## 2024-06-16 VITALS
SYSTOLIC BLOOD PRESSURE: 107 MMHG | DIASTOLIC BLOOD PRESSURE: 65 MMHG | TEMPERATURE: 98.1 F | HEART RATE: 95 BPM | OXYGEN SATURATION: 99 % | RESPIRATION RATE: 18 BRPM

## 2024-06-16 PROBLEM — R10.30 PREGNANCY RELATED BILATERAL LOWER ABDOMINAL PAIN, ANTEPARTUM: Status: ACTIVE | Noted: 2024-06-16

## 2024-06-16 PROBLEM — R10.2 PAIN OF ROUND LIGAMENT AFFECTING PREGNANCY, ANTEPARTUM: Status: ACTIVE | Noted: 2024-06-16

## 2024-06-16 PROBLEM — O26.899 PAIN OF ROUND LIGAMENT AFFECTING PREGNANCY, ANTEPARTUM: Status: ACTIVE | Noted: 2024-06-16

## 2024-06-16 PROBLEM — O26.899 PREGNANCY RELATED BILATERAL LOWER ABDOMINAL PAIN, ANTEPARTUM: Status: ACTIVE | Noted: 2024-06-16

## 2024-06-16 LAB
ALBUMIN SERPL-MCNC: 3.2 G/DL (ref 3.5–5.2)
ALBUMIN/GLOB SERPL: 1 G/DL
ALP SERPL-CCNC: 84 U/L (ref 39–117)
ALT SERPL W P-5'-P-CCNC: 8 U/L (ref 1–33)
ANION GAP SERPL CALCULATED.3IONS-SCNC: 11.8 MMOL/L (ref 5–15)
AST SERPL-CCNC: 12 U/L (ref 1–32)
BILIRUB BLD-MCNC: NEGATIVE MG/DL
BILIRUB SERPL-MCNC: <0.2 MG/DL (ref 0–1.2)
BUN SERPL-MCNC: 3 MG/DL (ref 6–20)
BUN/CREAT SERPL: 7.7 (ref 7–25)
CALCIUM SPEC-SCNC: 8.5 MG/DL (ref 8.6–10.5)
CHLORIDE SERPL-SCNC: 105 MMOL/L (ref 98–107)
CLARITY, POC: CLEAR
CO2 SERPL-SCNC: 20.2 MMOL/L (ref 22–29)
COLOR UR: YELLOW
CREAT SERPL-MCNC: 0.39 MG/DL (ref 0.57–1)
DEPRECATED RDW RBC AUTO: 42.2 FL (ref 37–54)
EGFRCR SERPLBLD CKD-EPI 2021: 143.7 ML/MIN/1.73
ERYTHROCYTE [DISTWIDTH] IN BLOOD BY AUTOMATED COUNT: 13.6 % (ref 12.3–15.4)
GLOBULIN UR ELPH-MCNC: 3.1 GM/DL
GLUCOSE SERPL-MCNC: 106 MG/DL (ref 65–99)
GLUCOSE UR STRIP-MCNC: NEGATIVE MG/DL
HCT VFR BLD AUTO: 32.4 % (ref 34–46.6)
HGB BLD-MCNC: 10.6 G/DL (ref 12–15.9)
KETONES UR QL: NEGATIVE
LEUKOCYTE EST, POC: NEGATIVE
MCH RBC QN AUTO: 27.7 PG (ref 26.6–33)
MCHC RBC AUTO-ENTMCNC: 32.7 G/DL (ref 31.5–35.7)
MCV RBC AUTO: 84.6 FL (ref 79–97)
NITRITE UR-MCNC: NEGATIVE MG/ML
PH UR: 7 [PH] (ref 5–8)
PLATELET # BLD AUTO: 200 10*3/MM3 (ref 140–450)
PMV BLD AUTO: 10.5 FL (ref 6–12)
POTASSIUM SERPL-SCNC: 3.5 MMOL/L (ref 3.5–5.2)
PROT SERPL-MCNC: 6.3 G/DL (ref 6–8.5)
PROT UR STRIP-MCNC: NEGATIVE MG/DL
RBC # BLD AUTO: 3.83 10*6/MM3 (ref 3.77–5.28)
RBC # UR STRIP: NEGATIVE /UL
SODIUM SERPL-SCNC: 137 MMOL/L (ref 136–145)
SP GR UR: 1.01 (ref 1–1.03)
UROBILINOGEN UR QL: NORMAL
WBC NRBC COR # BLD AUTO: 10.96 10*3/MM3 (ref 3.4–10.8)

## 2024-06-16 PROCEDURE — 59025 FETAL NON-STRESS TEST: CPT

## 2024-06-16 PROCEDURE — G0463 HOSPITAL OUTPT CLINIC VISIT: HCPCS

## 2024-06-16 PROCEDURE — 81002 URINALYSIS NONAUTO W/O SCOPE: CPT | Performed by: OBSTETRICS & GYNECOLOGY

## 2024-06-16 PROCEDURE — 80053 COMPREHEN METABOLIC PANEL: CPT | Performed by: OBSTETRICS & GYNECOLOGY

## 2024-06-16 PROCEDURE — 85027 COMPLETE CBC AUTOMATED: CPT | Performed by: OBSTETRICS & GYNECOLOGY

## 2024-06-16 NOTE — LETTER
June 16, 2024     Patient: Nayla Rodriguez   YOB: 2000   Date of Visit: 6/16/2024       To Whom It May Concern:     Nayla Rodriguez was seen and treated in Labor and Delivery today.            Sincerely,  Rodolfo MARADIAGAC

## 2024-06-16 NOTE — H&P
[facility]  Obstetric History and Physical    Chief Complaint   Patient presents with    Non-stress Test       Subjective     HPI:    Patient is a 24 y.o. female  currently at 34w0d, who presents with onset of abdominal pain around 5 am this morning that resolved and returned later this morning while she was at work.  She report the pain is mild but gets worse with activity.  She denies leaking fluid, vaginal bleeding or regular contractions.  No contractions were noted on the external fetal monitor..    PNC provided by:  [facility]. Her prenatal care is benign.  Her previous obstetric/gynecological history is noted for   with  x1 at 37w3d weighing 6 lbs 7.4 oz .    The following portions of the patients history were reviewed and updated as appropriate:   current medications, allergies, past medical history, past surgical history, past family history, past social history and current problem list.     Prenatal Information:  Prenatal Results       Initial Prenatal Labs       Test Value Reference Range Date Time    Hemoglobin  12.3 g/dL 12.0 - 15.9 23 09       12.7 g/dL 12.0 - 15.9 23 1133    Hematocrit  38.4 % 34.0 - 46.6 23 0929       38.7 % 34.0 - 46.6 23 1133    Platelets  270 10*3/mm3 140 - 450 23 0929       257 10*3/mm3 140 - 450 23 1133    Rubella IgG  6.68 index Immune >0.99 23 0929    Hepatitis B SAg  Non-Reactive  Non-Reactive 23 0929    Hepatitis C Ab  Non-Reactive  Non-Reactive 23 0929    RPR  Non Reactive  Non Reactive 24 0944    T. Pallidum Ab   Non-Reactive  Non-Reactive 24 0957       Non-Reactive  Non-Reactive 23 0929    ABO  A   23 09    Rh  Positive   23 09    Antibody Screen  Negative   23 09       Negative   23 1133    HIV  Non-Reactive  Non-Reactive 23 0929    Urine Culture  No growth   24 1853       No growth   23 0917       25,000 CFU/mL Mixed Sol Isolated    12/01/23 1224    Gonorrhea  Not Detected  Not Detected  12/01/23 1224    Chlamydia  Not Detected  Not Detected  12/01/23 1224    TSH        HgB A1c         Varicella IgG        HgB Electrophoresis         Hemoglobinopathy (genetic testing)        Cystic fibrosis                   Fetal testing        Test Value Reference Range Date Time    NIPT        MSAFP  *Screen Negative*   03/04/24 0912    AFP-4                  2nd and 3rd Trimester       Test Value Reference Range Date Time    Hemoglobin (repeated)  10.6 g/dL 12.0 - 15.9 06/16/24 1001       11.6 g/dL 12.0 - 15.9 04/08/24 0957    Hematocrit (repeated)  32.4 % 34.0 - 46.6 06/16/24 1001       36.1 % 34.0 - 46.6 04/08/24 0957    Platelets   200 10*3/mm3 140 - 450 06/16/24 1001       257 10*3/mm3 140 - 450 04/08/24 0957       270 10*3/mm3 140 - 450 12/19/23 0929       257 10*3/mm3 140 - 450 12/14/23 1133    1 hour GTT   89 mg/dL 65 - 139 04/08/24 0957    Antibody Screen (repeated)        3rd TM syphilis scrn (repeated)  RPR   Non Reactive  Non Reactive 06/04/24 0944    3rd TM syphilis scrn (repeated) FTA        GTT Fasting        GTT 1 Hr        GTT 2 Hr        GTT 3 Hr        Group B Strep                  Other testing        Test Value Reference Range Date Time    Parvo IgG         CMV IgG                   Drug Screening       Test Value Reference Range Date Time    Amphetamine Screen        Barbiturate Screen  Negative  Negative 12/01/23 1224    Benzodiazepine Screen  Negative  Negative 12/01/23 1224    Methadone Screen  Negative  Negative 12/01/23 1224    Phencyclidine Screen        Opiates Screen  Negative  Negative 12/01/23 1224    THC Screen  Negative  Negative 12/01/23 1224    Cocaine Screen  Negative  Negative 12/01/23 1224    Propoxyphene Screen        Buprenorphine Screen        Methamphetamine Screen        Oxycodone Screen  Negative  Negative 12/01/23 1224    Tricyclic Antidepressants Screen                  Legend    ^: Historical                           External Prenatal Results       Pregnancy Outside Results - Transcribed From Office Records - See Scanned Records For Details       Test Value Date Time    ABO  A  12/19/23 0929    Rh  Positive  12/19/23 0929    Antibody Screen  Negative  12/19/23 0929       Negative  12/14/23 1133    Varicella IgG       Rubella  6.68 index 12/19/23 0929    Hgb  10.6 g/dL 06/16/24 1001       11.6 g/dL 04/08/24 0957       12.3 g/dL 12/19/23 0929       12.7 g/dL 12/14/23 1133    Hct  32.4 % 06/16/24 1001       36.1 % 04/08/24 0957       38.4 % 12/19/23 0929       38.7 % 12/14/23 1133    HgB A1c        1h GTT  89 mg/dL 04/08/24 0957    3h GTT Fasting       3h GTT 1 hour       3h GTT 2 hour       3h GTT 3 hour        Gonorrhea (discrete)  Not Detected  12/01/23 1224    Chlamydia (discrete)  Not Detected  12/01/23 1224    RPR  Non Reactive  06/04/24 0944    Syphilis Antibody       HBsAg  Non-Reactive  12/19/23 0929    Herpes Simplex Virus PCR       Herpes Simplex VIrus Culture       HIV  Non-Reactive  12/19/23 0929    Hep C RNA Quant PCR       Hep C Antibody  Non-Reactive  12/19/23 0929    AFP  34.5 ng/mL 03/04/24 0912    NIPT       Cystic Fibroisis        Group B Strep       GBS Susceptibility to Clindamycin       GBS Susceptibility to Erythromycin       Fetal Fibronectin       Genetic Testing, Maternal Blood                 Drug Screening       Test Value Date Time    Urine Drug Screen       Amphetamine Screen       Barbiturate Screen  Negative  12/01/23 1224    Benzodiazepine Screen  Negative  12/01/23 1224    Methadone Screen  Negative  12/01/23 1224    Phencyclidine Screen       Opiates Screen  Negative  12/01/23 1224    THC Screen  Negative  12/01/23 1224    Cocaine Screen       Propoxyphene Screen       Buprenorphine Screen       Methamphetamine Screen       Oxycodone Screen  Negative  12/01/23 1224    Tricyclic Antidepressants Screen                 Legend    ^: Historical                             Past OB  History:     OB History    Para Term  AB Living   2 1 1 0 0 1   SAB IAB Ectopic Molar Multiple Live Births   0 0 0 0 0 1      # Outcome Date GA Lbr Mateusz/2nd Weight Sex Type Anes PTL Lv   2 Current            1 Term 22 37w3d 02:54 / 00:10 2930 g (6 lb 7.4 oz) M Vag-Spont None N RADHA      Complications: Precipitant delivery      Name: RANDI GARCIA      Apgar1: 8  Apgar5: 8      Obstetric Comments   Uncomplicated G1 preg and del/ ap 2024       Past Medical History: Past Medical History:   Diagnosis Date    Anxiety     Contusion 2015    Depression     Pain of round ligament affecting pregnancy, antepartum 2024      Past Surgical History Past Surgical History:   Procedure Laterality Date    KNEE ARTHROSCOPY W/ MENISCECTOMY Left     TONSILLECTOMY      WISDOM TOOTH EXTRACTION        Family History: Family History   Problem Relation Age of Onset    Diabetes Mother     Breast cancer Neg Hx     Ovarian cancer Neg Hx     Uterine cancer Neg Hx     Cervical cancer Neg Hx     Colon cancer Neg Hx     Skin cancer Neg Hx     Malig Hyperthermia Neg Hx     Clotting disorder Neg Hx     Deep vein thrombosis Neg Hx     Pulmonary embolism Neg Hx       Social History:  reports that she has never smoked. She has never been exposed to tobacco smoke. She has never used smokeless tobacco.   reports that she does not currently use alcohol.   reports no history of drug use.        General ROS: Pertinent items are noted in HPI    Home Medications:  famotidine, ondansetron, prenatal vitamin 27-0.8, and promethazine    Allergies:  No Known Allergies    Objective       Vital Signs Range for the last 24 hours  Temperature: Temp:  [98.1 °F (36.7 °C)] 98.1 °F (36.7 °C)   Temp Source: Temp src: Oral   BP: BP: (107)/(65) 107/65   Pulse: Heart Rate:  [95] 95   Respirations: Resp:  [18] 18   SPO2: SpO2:  [99 %] 99 %     Physical Examination:   General appearance - alert, well appearing, and in no distress  Mental  "status - alert, oriented to person, place, and time  Chest - clear to auscultation  Heart - normal rate, regular rhythm,  Abdomen - soft, nontender, nondistended,   Pelvic - normal external genitalia, vulva, vagina, cervix, and uterus   Back exam - full range of motion, no tenderness or pain on motion  Neurological - alert, oriented, normal speech  Extremities - peripheral pulses normal  Skin - normal coloration and turgor, no suspicious skin lesions noted    Presentation: Uncertain   Cervix:     Dilation:  Closed   Effacement:  Thick   Station:         Fetal Heart Rate Assessment   Method: Fetal HR Assessment Method: external   Beats/min: Fetal HR (beats/min): 135   Baseline: Fetal HR Baseline: normal range   Variability: Fetal HR Variability: moderate (amplitude range 6 to 25 bpm)   Accels: Fetal HR Accelerations: lasting at least 15 seconds   Decels: Fetal HR Decelerations: absent   Tracing Category:       Uterine Assessment   Method: Method: palpation, external tocotransducer   Frequency (min):     Ctx Count in 10 min:     Duration:     Intensity: Contraction Intensity: no contractions   Gallion Units:       GBS is unknown     Assessment & Plan       Pregnancy related bilateral lower abdominal pain, antepartum    Pain of round ligament affecting pregnancy, antepartum        Assessment:  1.  Intrauterine pregnancy at 34w0d gestation with reactive, reassuring fetal status.    2.  Bilateral Lower abdominal pain (round ligament pain)  3.  Obstetrical history significant for   with  at 37w3d  .  4.  GBS status: No results found for: \"STREPGPB\"    Plan:  1. Discharge to home.  Recommend pelvic support belt and avoid heavy lifting or strenuous activity  2.  Tylenol and low heating pad as needed  3.  Round ligament literature given in AVS  4.  Plan of care has been reviewed with patient and patient agrees.   5.  Risks, benefits of treatment plan have been discussed.  6.  All questions have been " answered.  7.  Keep next routine OB appt with Dr Neal on 6/20/24        Electronically signed by Bambi Armijo MD, 06/16/24, 6:45 PM EDT.

## 2024-06-16 NOTE — NON STRESS TEST
Obstetrical Non-stress Test Interpretation     Name:  Nayla Rodriguez  MRN: [id number]    24 y.o. female  at 34w0d    Indication: NST/LOWER ABDOMINAL PAIN THIS AM AT 0500 THEN LATER AM AT WORK      Fetal Assessment  Fetal Movement: active  Fetal HR Assessment Method: external  Fetal HR (beats/min): 135  Fetal HR Baseline: normal range  Fetal HR Variability: moderate (amplitude range 6 to 25 bpm)  Fetal HR Accelerations: lasting at least 15 seconds  Fetal HR Decelerations: absent    /65   Pulse 95   Temp 98.1 °F (36.7 °C) (Oral)   Resp 18   LMP 10/22/2023 (Approximate)   SpO2 99%     Reason for test: OB Triage (NST/ABDOMINAL PAIN AT 0500 TODAY AND THEN AGAIN WHEN SHE GOT TO WORK)  Date of Test: 2024  Time frame of test: 3035-4100  RN NST Interpretation: Reactive      Ivy Copeland RN  2024  11:22 EDT

## 2024-06-24 ENCOUNTER — TELEPHONE (OUTPATIENT)
Dept: OBSTETRICS AND GYNECOLOGY | Facility: CLINIC | Age: 24
End: 2024-06-24
Payer: COMMERCIAL

## 2024-06-26 NOTE — PROGRESS NOTES
OB FOLLOW UP      Chief Complaint   Patient presents with    Routine Prenatal Visit     Subjective:   No complaints    Objective:  /83   Wt 111 kg (244 lb)   LMP 10/22/2023 (Approximate)   BMI 33.09 kg/m²  6.35 kg (14 lb) Facility age limit for growth %mathew is 20 years.  See OB flow sheet for fundal height (not performed if US day of OV), FHT, edema, cvx exam if performed, and Upro/Uglu  Chaperone present during pelvic exam if performed.  GBS RV swab performed today    Assessment and Plan:  36w5d     Diagnoses and all orders for this visit:    1. Supervision of other normal pregnancy, antepartum (Primary)  Overview:  IKE finalized: 7/28/2024 by LMP and 7week US    NIPS neg XY, CF neg/SMA failed x2, AFP negative    COVID: recommended, declines  Tdap: recommended, s/p Rx, declines    ?Sterilization:  Declined  FU TPA 28-32weeks: NEG    Anatomy US: 3/18/24 normal anatomy, anterior placenta,  g, 78%, AC 59%    PROBLEM LIST/PLAN:   Anxiety/depression-no medications.  Plans meds after delivery w PCP had pharmacogenetic testing 2022.  Stable  History of precipitous delivery w G1- consider IOL at 39+- pt desires   22Jul 39+1 AP IOL    Orders:  -     POC Urinalysis Dipstick  -     Group B Strep (Molecular) - Swab, Vaginal/Rectum      Counseling:    OB precautions, leaking, VB, dwight quintanilla vs PTL/Labor  FKC  IOL scheduled    Reassuring pregnancy progress. Questions answered.  Continue PNV.  Importance of healthy eating, obtaining sufficient sleep, and staying active unless hypertensive- activity modified as directed.    Return in about 1 week (around 7/12/2024) for FU OB q1wk to IKE/Delivery.            Wendi Neal,   07/05/2024    [facility]  [facility] [place] [address]  Dept: [phone]  Dept Fax: [phone]  Loc: [phone]  Loc Fax: [phone]

## 2024-07-05 ENCOUNTER — ROUTINE PRENATAL (OUTPATIENT)
Dept: OBSTETRICS AND GYNECOLOGY | Facility: CLINIC | Age: 24
End: 2024-07-05
Payer: COMMERCIAL

## 2024-07-05 VITALS — DIASTOLIC BLOOD PRESSURE: 83 MMHG | WEIGHT: 244 LBS | SYSTOLIC BLOOD PRESSURE: 131 MMHG | BODY MASS INDEX: 33.09 KG/M2

## 2024-07-05 DIAGNOSIS — Z34.80 SUPERVISION OF OTHER NORMAL PREGNANCY, ANTEPARTUM: Primary | ICD-10-CM

## 2024-07-05 LAB
GLUCOSE UR STRIP-MCNC: NEGATIVE MG/DL
PROT UR STRIP-MCNC: NEGATIVE MG/DL

## 2024-07-05 PROCEDURE — 87653 STREP B DNA AMP PROBE: CPT | Performed by: OBSTETRICS & GYNECOLOGY

## 2024-07-06 LAB — GROUP B STREP, DNA: NEGATIVE

## 2024-07-12 NOTE — PROGRESS NOTES
OB FOLLOW UP      Chief Complaint   Patient presents with    Routine Prenatal Visit     Subjective:   Low back pain    Objective:  /83   Wt 111 kg (245 lb 9.6 oz)   LMP 10/22/2023 (Approximate)   BMI 33.31 kg/m²  7.076 kg (15 lb 9.6 oz) Body mass index is 33.31 kg/m².  See OB flow sheet for fundal height (not performed if US day of OV), FHT, edema, cvx exam if performed, and Upro/Uglu. Chaperone present during pelvic exam if performed.      Assessment and Plan:  38w1d     Diagnoses and all orders for this visit:    1. Supervision of other normal pregnancy, antepartum (Primary)  Overview:  IKE finalized: 7/28/2024 by LMP and 7week US    NIPS neg XY, CF neg/SMA failed x2, AFP negative    COVID: recommended, declines  Tdap: recommended, s/p Rx, declines    ?Sterilization:  Declined  FU TPA 28-32weeks: NEG    Anatomy US: 3/18/24 normal anatomy, anterior placenta,  g, 78%, AC 59%    PROBLEM LIST/PLAN:   Anxiety/depression-no medications.  Plans meds after delivery w PCP had pharmacogenetic testing 2022.  Stable  History of precipitous delivery w G1- consider IOL at 39+- pt desires   22Jul 39+1 AP IOL    Orders:  -     POC Urinalysis Dipstick      Counseling:    OB precautions, leaking, VB, dwight quintanilla vs PTL/Labor  FKC  BACK PAIN discussed.  Recommend conservative measures heat, warm bath, pregnancy support belt, tylenol prn.  IOL reviewed in detail.  R/B/A/SE/E.  All history reviewed and updated.  Pre-IOL exam performed.  Length can be 24-48+hrs.  PLAN: pitocin and cvx cath, then AROM.  All questions answered.  She desires to proceed as planned.  She understands during early am the OB Hospitalist physicians will manage her labor and deliver prn any emergencies.      Reassuring pregnancy progress. Questions answered.  Continue PNV.  Importance of healthy eating, obtaining sufficient sleep, and staying active unless hypertensive- activity modified as directed.    Return for Postpartum FU 3-4weeks after  ROQUE.            Wendi Neal,   07/15/2024    [facility]  [address] [place] [address]  Dept: [phone]  Dept Fax: [phone]  Loc: [phone]  Loc Fax: [phone]

## 2024-07-14 PROBLEM — R11.2 NAUSEA AND VOMITING: Status: RESOLVED | Noted: 2024-05-14 | Resolved: 2024-07-14

## 2024-07-14 PROBLEM — R10.2 PAIN OF ROUND LIGAMENT AFFECTING PREGNANCY, ANTEPARTUM: Status: RESOLVED | Noted: 2024-06-16 | Resolved: 2024-07-14

## 2024-07-14 PROBLEM — O26.899 PREGNANCY RELATED BILATERAL LOWER ABDOMINAL PAIN, ANTEPARTUM: Status: RESOLVED | Noted: 2024-06-16 | Resolved: 2024-07-14

## 2024-07-14 PROBLEM — O26.899 PAIN OF ROUND LIGAMENT AFFECTING PREGNANCY, ANTEPARTUM: Status: RESOLVED | Noted: 2024-06-16 | Resolved: 2024-07-14

## 2024-07-14 PROBLEM — R10.30 PREGNANCY RELATED BILATERAL LOWER ABDOMINAL PAIN, ANTEPARTUM: Status: RESOLVED | Noted: 2024-06-16 | Resolved: 2024-07-14

## 2024-07-15 ENCOUNTER — ROUTINE PRENATAL (OUTPATIENT)
Dept: OBSTETRICS AND GYNECOLOGY | Facility: CLINIC | Age: 24
End: 2024-07-15
Payer: COMMERCIAL

## 2024-07-15 VITALS — WEIGHT: 245.6 LBS | DIASTOLIC BLOOD PRESSURE: 83 MMHG | BODY MASS INDEX: 33.31 KG/M2 | SYSTOLIC BLOOD PRESSURE: 134 MMHG

## 2024-07-15 DIAGNOSIS — Z34.80 SUPERVISION OF OTHER NORMAL PREGNANCY, ANTEPARTUM: Primary | ICD-10-CM

## 2024-07-15 LAB
GLUCOSE UR STRIP-MCNC: NEGATIVE MG/DL
PROT UR STRIP-MCNC: NEGATIVE MG/DL

## 2024-07-15 PROCEDURE — 99214 OFFICE O/P EST MOD 30 MIN: CPT | Performed by: OBSTETRICS & GYNECOLOGY

## 2024-07-17 ENCOUNTER — TELEPHONE (OUTPATIENT)
Dept: OBSTETRICS AND GYNECOLOGY | Facility: CLINIC | Age: 24
End: 2024-07-17
Payer: COMMERCIAL

## 2024-07-17 NOTE — TELEPHONE ENCOUNTER
Hub staff attempted to follow warm transfer process and was unsuccessful     Caller: Nayla Rodriguez    Relationship to patient: Self    Best call back number: [phone] / LVM    Patient is needing: OB PT IS BEING INDUCED ON 07/22/24 AND DOESN'T KNOW WHAT TIME SHE NEEDS TO BE THERE - PLEASE CALL THE PT TO DISCUSS AND GO OVER    PT IS ALSO HAVING A LOT LOWER ABDOMINAL PAIN - HURTS TO WALK, SIT EVERYTHING     PT WANTS TO KNOW IF SHE SHOULD GO TO L&D TO BE EVALUATED     PLEASE CALL THE PT     THANK YOU!

## 2024-07-17 NOTE — TELEPHONE ENCOUNTER
Patient called and advised of induction time per Lizzie. Discussed her pain and comfort measures. She was given labor precautions and voiced understanding.

## 2024-07-21 ENCOUNTER — PREP FOR SURGERY (OUTPATIENT)
Dept: OTHER | Facility: HOSPITAL | Age: 24
End: 2024-07-21
Payer: COMMERCIAL

## 2024-07-21 DIAGNOSIS — Z34.90 ENCOUNTER FOR INDUCTION OF LABOR: Primary | ICD-10-CM

## 2024-07-21 RX ORDER — HYDROCODONE BITARTRATE AND ACETAMINOPHEN 5; 325 MG/1; MG/1
1 TABLET ORAL EVERY 4 HOURS PRN
Status: CANCELLED | OUTPATIENT
Start: 2024-07-21 | End: 2024-07-28

## 2024-07-21 RX ORDER — TERBUTALINE SULFATE 1 MG/ML
0.25 INJECTION, SOLUTION SUBCUTANEOUS AS NEEDED
Status: CANCELLED | OUTPATIENT
Start: 2024-07-21

## 2024-07-21 RX ORDER — PROMETHAZINE HYDROCHLORIDE 12.5 MG/1
12.5 TABLET ORAL EVERY 6 HOURS PRN
Status: CANCELLED | OUTPATIENT
Start: 2024-07-21

## 2024-07-21 RX ORDER — METHYLERGONOVINE MALEATE 0.2 MG/ML
200 INJECTION INTRAVENOUS ONCE AS NEEDED
Status: CANCELLED | OUTPATIENT
Start: 2024-07-21

## 2024-07-21 RX ORDER — SODIUM CHLORIDE, SODIUM LACTATE, POTASSIUM CHLORIDE, CALCIUM CHLORIDE 600; 310; 30; 20 MG/100ML; MG/100ML; MG/100ML; MG/100ML
125 INJECTION, SOLUTION INTRAVENOUS CONTINUOUS
Status: CANCELLED | OUTPATIENT
Start: 2024-07-21

## 2024-07-21 RX ORDER — FAMOTIDINE 20 MG/1
20 TABLET, FILM COATED ORAL 2 TIMES DAILY PRN
Status: CANCELLED | OUTPATIENT
Start: 2024-07-21

## 2024-07-21 RX ORDER — OXYTOCIN/0.9 % SODIUM CHLORIDE 30/500 ML
1-16 PLASTIC BAG, INJECTION (ML) INTRAVENOUS
Status: CANCELLED | OUTPATIENT
Start: 2024-07-21

## 2024-07-21 RX ORDER — ONDANSETRON 4 MG/1
4 TABLET, ORALLY DISINTEGRATING ORAL EVERY 6 HOURS PRN
Status: CANCELLED | OUTPATIENT
Start: 2024-07-21

## 2024-07-21 RX ORDER — LIDOCAINE HYDROCHLORIDE 10 MG/ML
0.5 INJECTION, SOLUTION EPIDURAL; INFILTRATION; INTRACAUDAL; PERINEURAL ONCE AS NEEDED
Status: CANCELLED | OUTPATIENT
Start: 2024-07-21

## 2024-07-21 RX ORDER — FAMOTIDINE 10 MG/ML
20 INJECTION, SOLUTION INTRAVENOUS ONCE AS NEEDED
Status: CANCELLED | OUTPATIENT
Start: 2024-07-21

## 2024-07-21 RX ORDER — METOCLOPRAMIDE HYDROCHLORIDE 5 MG/ML
10 INJECTION INTRAMUSCULAR; INTRAVENOUS ONCE AS NEEDED
Status: CANCELLED | OUTPATIENT
Start: 2024-07-21

## 2024-07-21 RX ORDER — ONDANSETRON 2 MG/ML
4 INJECTION INTRAMUSCULAR; INTRAVENOUS EVERY 6 HOURS PRN
Status: CANCELLED | OUTPATIENT
Start: 2024-07-21

## 2024-07-21 RX ORDER — PROMETHAZINE HYDROCHLORIDE 25 MG/1
25 TABLET ORAL EVERY 6 HOURS PRN
Status: CANCELLED | OUTPATIENT
Start: 2024-07-21

## 2024-07-21 RX ORDER — ACETAMINOPHEN 325 MG/1
650 TABLET ORAL ONCE AS NEEDED
Status: CANCELLED | OUTPATIENT
Start: 2024-07-21

## 2024-07-21 RX ORDER — HYDROCODONE BITARTRATE AND ACETAMINOPHEN 10; 325 MG/1; MG/1
1 TABLET ORAL EVERY 4 HOURS PRN
Status: CANCELLED | OUTPATIENT
Start: 2024-07-21 | End: 2024-07-28

## 2024-07-21 RX ORDER — MISOPROSTOL 200 UG/1
800 TABLET ORAL AS NEEDED
Status: CANCELLED | OUTPATIENT
Start: 2024-07-21

## 2024-07-21 RX ORDER — SODIUM CHLORIDE 0.9 % (FLUSH) 0.9 %
10 SYRINGE (ML) INJECTION AS NEEDED
Status: CANCELLED | OUTPATIENT
Start: 2024-07-21

## 2024-07-21 RX ORDER — OXYTOCIN/0.9 % SODIUM CHLORIDE 30/500 ML
250 PLASTIC BAG, INJECTION (ML) INTRAVENOUS CONTINUOUS
Status: CANCELLED | OUTPATIENT
Start: 2024-07-21 | End: 2024-07-21

## 2024-07-21 RX ORDER — FAMOTIDINE 10 MG/ML
20 INJECTION, SOLUTION INTRAVENOUS 2 TIMES DAILY PRN
Status: CANCELLED | OUTPATIENT
Start: 2024-07-21

## 2024-07-21 RX ORDER — FAMOTIDINE 20 MG/1
20 TABLET, FILM COATED ORAL ONCE AS NEEDED
Status: CANCELLED | OUTPATIENT
Start: 2024-07-21

## 2024-07-21 RX ORDER — ACETAMINOPHEN 325 MG/1
650 TABLET ORAL EVERY 4 HOURS PRN
Status: CANCELLED | OUTPATIENT
Start: 2024-07-21

## 2024-07-21 RX ORDER — SODIUM CHLORIDE 9 MG/ML
40 INJECTION, SOLUTION INTRAVENOUS AS NEEDED
Status: CANCELLED | OUTPATIENT
Start: 2024-07-21

## 2024-07-21 RX ORDER — IBUPROFEN 800 MG/1
800 TABLET ORAL ONCE AS NEEDED
Status: CANCELLED | OUTPATIENT
Start: 2024-07-21

## 2024-07-21 RX ORDER — SODIUM CHLORIDE 0.9 % (FLUSH) 0.9 %
10 SYRINGE (ML) INJECTION EVERY 12 HOURS SCHEDULED
Status: CANCELLED | OUTPATIENT
Start: 2024-07-21

## 2024-07-21 RX ORDER — OXYTOCIN/0.9 % SODIUM CHLORIDE 30/500 ML
999 PLASTIC BAG, INJECTION (ML) INTRAVENOUS ONCE
Status: CANCELLED | OUTPATIENT
Start: 2024-07-21 | End: 2024-07-21

## 2024-07-21 RX ORDER — MAGNESIUM CARB/ALUMINUM HYDROX 105-160MG
30 TABLET,CHEWABLE ORAL ONCE
Status: CANCELLED | OUTPATIENT
Start: 2024-07-21 | End: 2024-07-21

## 2024-07-21 RX ORDER — CITRIC ACID/SODIUM CITRATE 334-500MG
30 SOLUTION, ORAL ORAL ONCE AS NEEDED
Status: CANCELLED | OUTPATIENT
Start: 2024-07-21

## 2024-07-21 NOTE — H&P (VIEW-ONLY)
OB HISTORY AND PHYSICAL      SUBJECTIVE:    24 y.o. female  currently at 39w0d [facility] complicated by:      Patient Active Problem List    Diagnosis     Supervision of other normal pregnancy, antepartum [Z34.80]     Heartburn during pregnancy, antepartum [O26.899, R12]     Depression [F32.A]        CC/HPI:  Presents with Scheduled IOL.  Preset delivery with G1.    ROS: No leaking fluid, No vaginal bleeding, No contractions, and Is feeling adequate FM    Past OB History:   OB History    Para Term  AB Living   2 1 1 0 0 1   SAB IAB Ectopic Molar Multiple Live Births   0 0 0 0   1      # Outcome Date GA Lbr Mateusz/2nd Weight Sex Type Anes PTL Lv   2 Current            1 Term 21 37w3d 02:54 / 00:10 2930 g (6 lb 7.4 oz) M Vag-Spont None N RADHA      Complications: Precipitant delivery      Obstetric Comments   Uncomplicated G1 preg and del/ ap 2024        Prenatal Labs:    Prenatal Results       Initial Prenatal Labs       Test Value Reference Range Date Time    Hemoglobin  12.3 g/dL 12.0 - 15.9 23 0929       12.7 g/dL 12.0 - 15.9 23 1133    Hematocrit  38.4 % 34.0 - 46.6 23 0929       38.7 % 34.0 - 46.6 23 1133    Platelets  270 10*3/mm3 140 - 450 23 0929       257 10*3/mm3 140 - 450 23 1133    Rubella IgG  6.68 index Immune >0.99 23 0929    Hepatitis B SAg  Non-Reactive  Non-Reactive 23 0929    Hepatitis C Ab  Non-Reactive  Non-Reactive 23 0929    RPR  Non Reactive  Non Reactive 24 0944    T. Pallidum Ab   Non-Reactive  Non-Reactive 24 0957       Non-Reactive  Non-Reactive 23 0929    ABO  A   23 0929    Rh  Positive   23 0929    Antibody Screen  Negative   23 0929       Negative   23 1133    HIV  Non-Reactive  Non-Reactive 23 0929    Urine Culture  No growth   24 1853       No growth   23 0917       25,000 CFU/mL Mixed Sol Isolated   23 1224    Gonorrhea  Not Detected  Not  Detected  12/01/23 1224    Chlamydia  Not Detected  Not Detected  12/01/23 1224    TSH        HgB A1c         Varicella IgG        Hemoglobinopathy Fractionation  Comment   12/19/23 0929    Hemoglobinopathy (genetic testing)        Cystic fibrosis                   Fetal testing        Test Value Reference Range Date Time    NIPT        MSAFP  *Screen Negative*   03/04/24 0912    AFP-4                  2nd and 3rd Trimester       Test Value Reference Range Date Time    Hemoglobin (repeated)  10.6 g/dL 12.0 - 15.9 06/16/24 1001       11.6 g/dL 12.0 - 15.9 04/08/24 0957    Hematocrit (repeated)  32.4 % 34.0 - 46.6 06/16/24 1001       36.1 % 34.0 - 46.6 04/08/24 0957    Platelets   200 10*3/mm3 140 - 450 06/16/24 1001       257 10*3/mm3 140 - 450 04/08/24 0957       270 10*3/mm3 140 - 450 12/19/23 0929       257 10*3/mm3 140 - 450 12/14/23 1133    1 hour GTT   89 mg/dL 65 - 139 04/08/24 0957    Antibody Screen (repeated)        3rd TM syphilis scrn (repeated)  RPR   Non Reactive  Non Reactive 06/04/24 0944    3rd TM syphilis scrn (repeated) TP-Ab        3rd TM syphilis screen TB-Ab (FTA)        Syphilis cascade test TP-Ab (EIA)        Syphilis cascade TPPA        GTT Fasting        GTT 1 Hr        GTT 2 Hr        GTT 3 Hr        Group B Strep  Negative  Negative 07/05/24 1431              Other testing        Test Value Reference Range Date Time    Parvo IgG         CMV IgG                   Drug Screening       Test Value Reference Range Date Time    Amphetamine Screen        Barbiturate Screen  Negative  Negative 12/01/23 1224    Benzodiazepine Screen  Negative  Negative 12/01/23 1224    Methadone Screen  Negative  Negative 12/01/23 1224    Phencyclidine Screen        Opiates Screen  Negative  Negative 12/01/23 1224    THC Screen  Negative  Negative 12/01/23 1224    Cocaine Screen  Negative  Negative 12/01/23 1224    Propoxyphene Screen        Buprenorphine Screen        Methamphetamine Screen        Oxycodone Screen   Negative  Negative 12/01/23 1224    Tricyclic Antidepressants Screen                   PMHx:    Past Medical History:   Diagnosis Date    Anxiety     Contusion 05/05/2015    Depression        Medications:  famotidine, ondansetron, prenatal vitamin 27-0.8, and promethazine    Allergies:  No Known Allergies    PSHx:    Past Surgical History:   Procedure Laterality Date    KNEE ARTHROSCOPY W/ MENISCECTOMY Left     TONSILLECTOMY      WISDOM TOOTH EXTRACTION         Social History:    reports that she has never smoked. She has never been exposed to tobacco smoke. She has never used smokeless tobacco. She reports that she does not currently use alcohol. She reports that she does not use drugs.    Family History: Non contributory    Immunizations: See prenatal record for Tdap, Flu, Covid and/or other vaccinations    PHYSICAL EXAM:     General- NAD, alert and oriented, appropriate  Psych- normal mood, good memory  Cardiovascular- Regular rhythm, no murnurs  Respiratory- CTA to bases, no wheezes  Abdomen- Gravid, non tender  Fundus-  Non tender.  Size: consistent with dates  EFW- 7 1/2 lbs, 8 lbs   Pelvis-  Adequate   Cervix- 2cm / 50% / -3  Presentation- VTX  Extremities/DTRs- No edema, bilaterally equal, no signs of DVT    Fetal HR: Doptones 110-160, see last OV note  Contractions: Not complaining of any regular contractions      ASSESSMENT:  39w0d  Scheduled IOL  History of precipitous delivery  Anxiety/depression, history of-no medications.  Plans medications after delivery with PCP.  Lab Results   Component Value Date    STREPGPB Negative 07/05/2024       PLAN:  Admit  Delivery: IOL, pitocin and cervical catheter, OBGYN Hospitalist to admit at pts arrival and manage/deliver prn any emergencies until 0700.  Pt instructed to arrive at 0400    Plan of care [facility] course, R/B/A/potential SE, suspected length <24hrs have been reviewed with patient and any family or friends present, questions answered to her/his/their  satisfaction.  Pt desires to proceed as above.    Counseling:The patient was counseled on the risks, benefits and alternatives of Induction.  Risks reviewed, but are not limited to: bleeding, transfusion, fetal intolerance,  (possibly emergent),  and uterine rupture.  She declines expectant management or  and desires induction.  All her questions have been answered to her satisfaction and she desires to proceed.          Electronically signed by Wendi Neal DO, 24, 1:17 PM EDT.    [facility] ORDERS ONLY  [address] [place] [address]  Dept: [phone]  Loc: [phone]

## 2024-07-21 NOTE — H&P
OB HISTORY AND PHYSICAL      SUBJECTIVE:    24 y.o. female  currently at 39w0d [facility] complicated by:      Patient Active Problem List    Diagnosis     Supervision of other normal pregnancy, antepartum [Z34.80]     Heartburn during pregnancy, antepartum [O26.899, R12]     Depression [F32.A]        CC/HPI:  Presents with Scheduled IOL.  Preset delivery with G1.    ROS: No leaking fluid, No vaginal bleeding, No contractions, and Is feeling adequate FM    Past OB History:   OB History    Para Term  AB Living   2 1 1 0 0 1   SAB IAB Ectopic Molar Multiple Live Births   0 0 0 0   1      # Outcome Date GA Lbr Mateusz/2nd Weight Sex Type Anes PTL Lv   2 Current            1 Term 21 37w3d 02:54 / 00:10 2930 g (6 lb 7.4 oz) M Vag-Spont None N RADHA      Complications: Precipitant delivery      Obstetric Comments   Uncomplicated G1 preg and del/ ap 2024        Prenatal Labs:    Prenatal Results       Initial Prenatal Labs       Test Value Reference Range Date Time    Hemoglobin  12.3 g/dL 12.0 - 15.9 23 0929       12.7 g/dL 12.0 - 15.9 23 1133    Hematocrit  38.4 % 34.0 - 46.6 23 0929       38.7 % 34.0 - 46.6 23 1133    Platelets  270 10*3/mm3 140 - 450 23 0929       257 10*3/mm3 140 - 450 23 1133    Rubella IgG  6.68 index Immune >0.99 23 0929    Hepatitis B SAg  Non-Reactive  Non-Reactive 23 0929    Hepatitis C Ab  Non-Reactive  Non-Reactive 23 0929    RPR  Non Reactive  Non Reactive 24 0944    T. Pallidum Ab   Non-Reactive  Non-Reactive 24 0957       Non-Reactive  Non-Reactive 23 0929    ABO  A   23 0929    Rh  Positive   23 0929    Antibody Screen  Negative   23 0929       Negative   23 1133    HIV  Non-Reactive  Non-Reactive 23 0929    Urine Culture  No growth   24 1853       No growth   23 0917       25,000 CFU/mL Mixed Sol Isolated   23 1224    Gonorrhea  Not Detected  Not  Detected  12/01/23 1224    Chlamydia  Not Detected  Not Detected  12/01/23 1224    TSH        HgB A1c         Varicella IgG        Hemoglobinopathy Fractionation  Comment   12/19/23 0929    Hemoglobinopathy (genetic testing)        Cystic fibrosis                   Fetal testing        Test Value Reference Range Date Time    NIPT        MSAFP  *Screen Negative*   03/04/24 0912    AFP-4                  2nd and 3rd Trimester       Test Value Reference Range Date Time    Hemoglobin (repeated)  10.6 g/dL 12.0 - 15.9 06/16/24 1001       11.6 g/dL 12.0 - 15.9 04/08/24 0957    Hematocrit (repeated)  32.4 % 34.0 - 46.6 06/16/24 1001       36.1 % 34.0 - 46.6 04/08/24 0957    Platelets   200 10*3/mm3 140 - 450 06/16/24 1001       257 10*3/mm3 140 - 450 04/08/24 0957       270 10*3/mm3 140 - 450 12/19/23 0929       257 10*3/mm3 140 - 450 12/14/23 1133    1 hour GTT   89 mg/dL 65 - 139 04/08/24 0957    Antibody Screen (repeated)        3rd TM syphilis scrn (repeated)  RPR   Non Reactive  Non Reactive 06/04/24 0944    3rd TM syphilis scrn (repeated) TP-Ab        3rd TM syphilis screen TB-Ab (FTA)        Syphilis cascade test TP-Ab (EIA)        Syphilis cascade TPPA        GTT Fasting        GTT 1 Hr        GTT 2 Hr        GTT 3 Hr        Group B Strep  Negative  Negative 07/05/24 1431              Other testing        Test Value Reference Range Date Time    Parvo IgG         CMV IgG                   Drug Screening       Test Value Reference Range Date Time    Amphetamine Screen        Barbiturate Screen  Negative  Negative 12/01/23 1224    Benzodiazepine Screen  Negative  Negative 12/01/23 1224    Methadone Screen  Negative  Negative 12/01/23 1224    Phencyclidine Screen        Opiates Screen  Negative  Negative 12/01/23 1224    THC Screen  Negative  Negative 12/01/23 1224    Cocaine Screen  Negative  Negative 12/01/23 1224    Propoxyphene Screen        Buprenorphine Screen        Methamphetamine Screen        Oxycodone Screen   Negative  Negative 12/01/23 1224    Tricyclic Antidepressants Screen                   PMHx:    Past Medical History:   Diagnosis Date    Anxiety     Contusion 05/05/2015    Depression        Medications:  famotidine, ondansetron, prenatal vitamin 27-0.8, and promethazine    Allergies:  No Known Allergies    PSHx:    Past Surgical History:   Procedure Laterality Date    KNEE ARTHROSCOPY W/ MENISCECTOMY Left     TONSILLECTOMY      WISDOM TOOTH EXTRACTION         Social History:    reports that she has never smoked. She has never been exposed to tobacco smoke. She has never used smokeless tobacco. She reports that she does not currently use alcohol. She reports that she does not use drugs.    Family History: Non contributory    Immunizations: See prenatal record for Tdap, Flu, Covid and/or other vaccinations    PHYSICAL EXAM:     General- NAD, alert and oriented, appropriate  Psych- normal mood, good memory  Cardiovascular- Regular rhythm, no murnurs  Respiratory- CTA to bases, no wheezes  Abdomen- Gravid, non tender  Fundus-  Non tender.  Size: consistent with dates  EFW- 7 1/2 lbs, 8 lbs   Pelvis-  Adequate   Cervix- 2cm / 50% / -3  Presentation- VTX  Extremities/DTRs- No edema, bilaterally equal, no signs of DVT    Fetal HR: Doptones 110-160, see last OV note  Contractions: Not complaining of any regular contractions      ASSESSMENT:  39w0d  Scheduled IOL  History of precipitous delivery  Anxiety/depression, history of-no medications.  Plans medications after delivery with PCP.  Lab Results   Component Value Date    STREPGPB Negative 07/05/2024       PLAN:  Admit  Delivery: IOL, pitocin and cervical catheter, OBGYN Hospitalist to admit at pts arrival and manage/deliver prn any emergencies until 0700.  Pt instructed to arrive at 0400    Plan of care [facility] course, R/B/A/potential SE, suspected length <24hrs have been reviewed with patient and any family or friends present, questions answered to her/his/their  satisfaction.  Pt desires to proceed as above.    Counseling:The patient was counseled on the risks, benefits and alternatives of Induction.  Risks reviewed, but are not limited to: bleeding, transfusion, fetal intolerance,  (possibly emergent),  and uterine rupture.  She declines expectant management or  and desires induction.  All her questions have been answered to her satisfaction and she desires to proceed.          Electronically signed by Wendi Neal DO, 24, 1:17 PM EDT.    [facility] ORDERS ONLY  [address] [place] [address]  Dept: [phone]  Loc: [phone]

## 2024-07-22 ENCOUNTER — ANESTHESIA EVENT (OUTPATIENT)
Dept: LABOR AND DELIVERY | Facility: HOSPITAL | Age: 24
End: 2024-07-22
Payer: COMMERCIAL

## 2024-07-22 ENCOUNTER — HOSPITAL ENCOUNTER (OUTPATIENT)
Dept: LABOR AND DELIVERY | Facility: HOSPITAL | Age: 24
Discharge: HOME | End: 2024-07-22
Payer: COMMERCIAL

## 2024-07-22 ENCOUNTER — HOSPITAL ENCOUNTER (INPATIENT)
Facility: HOSPITAL | Age: 24
LOS: 2 days | Discharge: HOME | End: 2024-07-24
Attending: OBSTETRICS & GYNECOLOGY | Admitting: OBSTETRICS & GYNECOLOGY
Payer: COMMERCIAL

## 2024-07-22 ENCOUNTER — ANESTHESIA (OUTPATIENT)
Dept: LABOR AND DELIVERY | Facility: HOSPITAL | Age: 24
End: 2024-07-22
Payer: COMMERCIAL

## 2024-07-22 DIAGNOSIS — Z34.90 ENCOUNTER FOR INDUCTION OF LABOR: ICD-10-CM

## 2024-07-22 PROBLEM — R12 HEARTBURN DURING PREGNANCY, ANTEPARTUM: Status: RESOLVED | Noted: 2024-05-14 | Resolved: 2024-07-22

## 2024-07-22 PROBLEM — O26.899 HEARTBURN DURING PREGNANCY, ANTEPARTUM: Status: RESOLVED | Noted: 2024-05-14 | Resolved: 2024-07-22

## 2024-07-22 LAB
ABO GROUP BLD: NORMAL
AMPHET+METHAMPHET UR QL: NEGATIVE
ATMOSPHERIC PRESS: 740.8 MMHG
ATMOSPHERIC PRESS: 743.4 MMHG
BARBITURATES UR QL SCN: NEGATIVE
BASE EXCESS BLDCOA CALC-SCNC: -3.3 MMOL/L (ref -2–2)
BASE EXCESS BLDCOV CALC-SCNC: -2.8 MMOL/L (ref -30–30)
BENZODIAZ UR QL SCN: NEGATIVE
BLD GP AB SCN SERPL QL: NEGATIVE
CANNABINOIDS SERPL QL: NEGATIVE
COCAINE UR QL: NEGATIVE
DEPRECATED RDW RBC AUTO: 41.2 FL (ref 37–54)
ERYTHROCYTE [DISTWIDTH] IN BLOOD BY AUTOMATED COUNT: 13.8 % (ref 12.3–15.4)
FENTANYL UR-MCNC: NEGATIVE NG/ML
HCO3 BLDCOA-SCNC: 24.4 MMOL/L
HCO3 BLDCOV-SCNC: 21.4 MMOL/L
HCT VFR BLD AUTO: 33.8 % (ref 34–46.6)
HGB BLD-MCNC: 10.8 G/DL (ref 12–15.9)
MCH RBC QN AUTO: 26.5 PG (ref 26.6–33)
MCHC RBC AUTO-ENTMCNC: 32 G/DL (ref 31.5–35.7)
MCV RBC AUTO: 82.8 FL (ref 79–97)
METHADONE UR QL SCN: NEGATIVE
OPIATES UR QL: NEGATIVE
OXYCODONE UR QL SCN: NEGATIVE
PCO2 BLDCOA: 53.4 MMHG (ref 33–49)
PCO2 BLDCOV: 34.4 MM HG (ref 35–51.3)
PH BLDCOA: 7.27 PH UNITS (ref 7.18–7.34)
PH BLDCOV: 7.4 PH UNITS (ref 7.26–7.4)
PLATELET # BLD AUTO: 201 10*3/MM3 (ref 140–450)
PMV BLD AUTO: 10.9 FL (ref 6–12)
PO2 BLDCOA: 21.1 MMHG
PO2 BLDCOV: 41 MM HG (ref 19–39)
RBC # BLD AUTO: 4.08 10*6/MM3 (ref 3.77–5.28)
RH BLD: POSITIVE
SAO2 % BLDCOA: 27.4 %
SAO2 % BLDCOV: 76.8 %
T&S EXPIRATION DATE: NORMAL
TREPONEMA PALLIDUM IGG+IGM AB [PRESENCE] IN SERUM OR PLASMA BY IMMUNOASSAY: NORMAL
WBC NRBC COR # BLD AUTO: 12.57 10*3/MM3 (ref 3.4–10.8)

## 2024-07-22 PROCEDURE — 25010000002 ONDANSETRON PER 1 MG: Performed by: OBSTETRICS & GYNECOLOGY

## 2024-07-22 PROCEDURE — 3E033VJ INTRODUCTION OF OTHER HORMONE INTO PERIPHERAL VEIN, PERCUTANEOUS APPROACH: ICD-10-PCS | Performed by: OBSTETRICS & GYNECOLOGY

## 2024-07-22 PROCEDURE — 85027 COMPLETE CBC AUTOMATED: CPT | Performed by: OBSTETRICS & GYNECOLOGY

## 2024-07-22 PROCEDURE — 25810000003 LACTATED RINGERS SOLUTION: Performed by: OBSTETRICS & GYNECOLOGY

## 2024-07-22 PROCEDURE — 25810000003 LACTATED RINGERS PER 1000 ML: Performed by: OBSTETRICS & GYNECOLOGY

## 2024-07-22 PROCEDURE — 80307 DRUG TEST PRSMV CHEM ANLYZR: CPT | Performed by: OBSTETRICS & GYNECOLOGY

## 2024-07-22 PROCEDURE — 25010000002 ROPIVACAINE PER 1 MG: Performed by: NURSE ANESTHETIST, CERTIFIED REGISTERED

## 2024-07-22 PROCEDURE — 25010000002 FENTANYL CITRATE (PF) 50 MCG/ML SOLUTION: Performed by: NURSE ANESTHETIST, CERTIFIED REGISTERED

## 2024-07-22 PROCEDURE — 86780 TREPONEMA PALLIDUM: CPT | Performed by: OBSTETRICS & GYNECOLOGY

## 2024-07-22 PROCEDURE — 63710000001 ONDANSETRON ODT 4 MG TABLET DISPERSIBLE: Performed by: OBSTETRICS & GYNECOLOGY

## 2024-07-22 PROCEDURE — C1755 CATHETER, INTRASPINAL: HCPCS | Performed by: NURSE ANESTHETIST, CERTIFIED REGISTERED

## 2024-07-22 PROCEDURE — 82803 BLOOD GASES ANY COMBINATION: CPT

## 2024-07-22 PROCEDURE — 10907ZC DRAINAGE OF AMNIOTIC FLUID, THERAPEUTIC FROM PRODUCTS OF CONCEPTION, VIA NATURAL OR ARTIFICIAL OPENING: ICD-10-PCS | Performed by: OBSTETRICS & GYNECOLOGY

## 2024-07-22 PROCEDURE — 59410 OBSTETRICAL CARE: CPT | Performed by: OBSTETRICS & GYNECOLOGY

## 2024-07-22 PROCEDURE — 86850 RBC ANTIBODY SCREEN: CPT | Performed by: OBSTETRICS & GYNECOLOGY

## 2024-07-22 PROCEDURE — 10D17ZZ EXTRACTION OF PRODUCTS OF CONCEPTION, RETAINED, VIA NATURAL OR ARTIFICIAL OPENING: ICD-10-PCS | Performed by: OBSTETRICS & GYNECOLOGY

## 2024-07-22 PROCEDURE — 51702 INSERT TEMP BLADDER CATH: CPT

## 2024-07-22 PROCEDURE — 86901 BLOOD TYPING SEROLOGIC RH(D): CPT | Performed by: OBSTETRICS & GYNECOLOGY

## 2024-07-22 PROCEDURE — 86900 BLOOD TYPING SEROLOGIC ABO: CPT | Performed by: OBSTETRICS & GYNECOLOGY

## 2024-07-22 PROCEDURE — 59160 D & C AFTER DELIVERY: CPT | Performed by: OBSTETRICS & GYNECOLOGY

## 2024-07-22 RX ORDER — HYDROCODONE BITARTRATE AND ACETAMINOPHEN 10; 325 MG/1; MG/1
1 TABLET ORAL EVERY 4 HOURS PRN
Status: DISCONTINUED | OUTPATIENT
Start: 2024-07-22 | End: 2024-07-22 | Stop reason: HOSPADM

## 2024-07-22 RX ORDER — IBUPROFEN 600 MG/1
600 TABLET ORAL EVERY 6 HOURS SCHEDULED
Status: DISCONTINUED | OUTPATIENT
Start: 2024-07-23 | End: 2024-07-24 | Stop reason: HOSPADM

## 2024-07-22 RX ORDER — TERBUTALINE SULFATE 1 MG/ML
0.25 INJECTION, SOLUTION SUBCUTANEOUS AS NEEDED
Status: DISCONTINUED | OUTPATIENT
Start: 2024-07-22 | End: 2024-07-22 | Stop reason: HOSPADM

## 2024-07-22 RX ORDER — SODIUM CHLORIDE, SODIUM LACTATE, POTASSIUM CHLORIDE, CALCIUM CHLORIDE 600; 310; 30; 20 MG/100ML; MG/100ML; MG/100ML; MG/100ML
125 INJECTION, SOLUTION INTRAVENOUS CONTINUOUS
Status: DISCONTINUED | OUTPATIENT
Start: 2024-07-22 | End: 2024-07-22

## 2024-07-22 RX ORDER — TRAMADOL HYDROCHLORIDE 50 MG/1
50 TABLET ORAL EVERY 6 HOURS PRN
Status: DISCONTINUED | OUTPATIENT
Start: 2024-07-22 | End: 2024-07-24 | Stop reason: HOSPADM

## 2024-07-22 RX ORDER — HYDROCODONE BITARTRATE AND ACETAMINOPHEN 5; 325 MG/1; MG/1
1 TABLET ORAL EVERY 4 HOURS PRN
Status: DISCONTINUED | OUTPATIENT
Start: 2024-07-22 | End: 2024-07-22 | Stop reason: HOSPADM

## 2024-07-22 RX ORDER — METHYLERGONOVINE MALEATE 0.2 MG/ML
200 INJECTION INTRAVENOUS ONCE AS NEEDED
Status: DISCONTINUED | OUTPATIENT
Start: 2024-07-22 | End: 2024-07-22 | Stop reason: HOSPADM

## 2024-07-22 RX ORDER — IBUPROFEN 800 MG/1
800 TABLET ORAL ONCE AS NEEDED
Status: COMPLETED | OUTPATIENT
Start: 2024-07-22 | End: 2024-07-22

## 2024-07-22 RX ORDER — SODIUM CHLORIDE 9 MG/ML
40 INJECTION, SOLUTION INTRAVENOUS AS NEEDED
Status: DISCONTINUED | OUTPATIENT
Start: 2024-07-22 | End: 2024-07-22 | Stop reason: HOSPADM

## 2024-07-22 RX ORDER — OXYTOCIN/0.9 % SODIUM CHLORIDE 30/500 ML
250 PLASTIC BAG, INJECTION (ML) INTRAVENOUS CONTINUOUS
Status: ACTIVE | OUTPATIENT
Start: 2024-07-22 | End: 2024-07-22

## 2024-07-22 RX ORDER — SODIUM CHLORIDE 0.9 % (FLUSH) 0.9 %
10 SYRINGE (ML) INJECTION EVERY 12 HOURS SCHEDULED
Status: DISCONTINUED | OUTPATIENT
Start: 2024-07-22 | End: 2024-07-22 | Stop reason: HOSPADM

## 2024-07-22 RX ORDER — LIDOCAINE HYDROCHLORIDE 10 MG/ML
0.5 INJECTION, SOLUTION EPIDURAL; INFILTRATION; INTRACAUDAL; PERINEURAL ONCE AS NEEDED
Status: DISCONTINUED | OUTPATIENT
Start: 2024-07-22 | End: 2024-07-22 | Stop reason: HOSPADM

## 2024-07-22 RX ORDER — ONDANSETRON 4 MG/1
4 TABLET, ORALLY DISINTEGRATING ORAL EVERY 6 HOURS PRN
Status: DISCONTINUED | OUTPATIENT
Start: 2024-07-22 | End: 2024-07-22 | Stop reason: HOSPADM

## 2024-07-22 RX ORDER — FENTANYL CITRATE 50 UG/ML
INJECTION, SOLUTION INTRAMUSCULAR; INTRAVENOUS
Status: COMPLETED
Start: 2024-07-22 | End: 2024-07-22

## 2024-07-22 RX ORDER — SODIUM CHLORIDE 0.9 % (FLUSH) 0.9 %
10 SYRINGE (ML) INJECTION AS NEEDED
Status: DISCONTINUED | OUTPATIENT
Start: 2024-07-22 | End: 2024-07-22 | Stop reason: HOSPADM

## 2024-07-22 RX ORDER — PROMETHAZINE HYDROCHLORIDE 25 MG/1
25 TABLET ORAL EVERY 6 HOURS PRN
Status: DISCONTINUED | OUTPATIENT
Start: 2024-07-22 | End: 2024-07-22 | Stop reason: HOSPADM

## 2024-07-22 RX ORDER — PROMETHAZINE HYDROCHLORIDE 25 MG/1
12.5 TABLET ORAL EVERY 4 HOURS PRN
Status: DISCONTINUED | OUTPATIENT
Start: 2024-07-22 | End: 2024-07-24 | Stop reason: HOSPADM

## 2024-07-22 RX ORDER — FENTANYL/ROPIVACAINE/NS/PF 2MCG/ML-.2
PLASTIC BAG, INJECTION (ML) INJECTION
Status: COMPLETED
Start: 2024-07-22 | End: 2024-07-22

## 2024-07-22 RX ORDER — FAMOTIDINE 10 MG/ML
20 INJECTION, SOLUTION INTRAVENOUS 2 TIMES DAILY PRN
Status: DISCONTINUED | OUTPATIENT
Start: 2024-07-22 | End: 2024-07-22 | Stop reason: HOSPADM

## 2024-07-22 RX ORDER — ACETAMINOPHEN 325 MG/1
650 TABLET ORAL ONCE AS NEEDED
Status: DISCONTINUED | OUTPATIENT
Start: 2024-07-22 | End: 2024-07-22 | Stop reason: HOSPADM

## 2024-07-22 RX ORDER — ONDANSETRON 2 MG/ML
4 INJECTION INTRAMUSCULAR; INTRAVENOUS EVERY 6 HOURS PRN
Status: DISCONTINUED | OUTPATIENT
Start: 2024-07-22 | End: 2024-07-22 | Stop reason: HOSPADM

## 2024-07-22 RX ORDER — MISOPROSTOL 200 UG/1
800 TABLET ORAL AS NEEDED
Status: DISCONTINUED | OUTPATIENT
Start: 2024-07-22 | End: 2024-07-22 | Stop reason: HOSPADM

## 2024-07-22 RX ORDER — METOCLOPRAMIDE HYDROCHLORIDE 5 MG/ML
10 INJECTION INTRAMUSCULAR; INTRAVENOUS ONCE AS NEEDED
Status: DISCONTINUED | OUTPATIENT
Start: 2024-07-22 | End: 2024-07-22 | Stop reason: HOSPADM

## 2024-07-22 RX ORDER — EPHEDRINE SULFATE 50 MG/ML
5 INJECTION, SOLUTION INTRAVENOUS
Status: DISCONTINUED | OUTPATIENT
Start: 2024-07-22 | End: 2024-07-22 | Stop reason: HOSPADM

## 2024-07-22 RX ORDER — ONDANSETRON 4 MG/1
4 TABLET, ORALLY DISINTEGRATING ORAL EVERY 8 HOURS PRN
Status: DISCONTINUED | OUTPATIENT
Start: 2024-07-22 | End: 2024-07-24 | Stop reason: HOSPADM

## 2024-07-22 RX ORDER — FAMOTIDINE 20 MG/1
20 TABLET, FILM COATED ORAL 2 TIMES DAILY PRN
Status: DISCONTINUED | OUTPATIENT
Start: 2024-07-22 | End: 2024-07-22 | Stop reason: HOSPADM

## 2024-07-22 RX ORDER — CITRIC ACID/SODIUM CITRATE 334-500MG
30 SOLUTION, ORAL ORAL ONCE AS NEEDED
Status: DISCONTINUED | OUTPATIENT
Start: 2024-07-22 | End: 2024-07-22 | Stop reason: HOSPADM

## 2024-07-22 RX ORDER — POLYETHYLENE GLYCOL 3350 17 G/17G
17 POWDER, FOR SOLUTION ORAL DAILY PRN
Status: DISCONTINUED | OUTPATIENT
Start: 2024-07-22 | End: 2024-07-24 | Stop reason: HOSPADM

## 2024-07-22 RX ORDER — ACETAMINOPHEN 325 MG/1
650 TABLET ORAL EVERY 4 HOURS PRN
Status: DISCONTINUED | OUTPATIENT
Start: 2024-07-22 | End: 2024-07-22 | Stop reason: HOSPADM

## 2024-07-22 RX ORDER — MORPHINE SULFATE 5 MG/ML
5 INJECTION, SOLUTION INTRAMUSCULAR; INTRAVENOUS
Status: DISCONTINUED | OUTPATIENT
Start: 2024-07-22 | End: 2024-07-22 | Stop reason: HOSPADM

## 2024-07-22 RX ORDER — FAMOTIDINE 20 MG/1
20 TABLET, FILM COATED ORAL ONCE AS NEEDED
Status: DISCONTINUED | OUTPATIENT
Start: 2024-07-22 | End: 2024-07-22 | Stop reason: HOSPADM

## 2024-07-22 RX ORDER — MAGNESIUM CARB/ALUMINUM HYDROX 105-160MG
30 TABLET,CHEWABLE ORAL ONCE
Status: DISCONTINUED | OUTPATIENT
Start: 2024-07-22 | End: 2024-07-22 | Stop reason: HOSPADM

## 2024-07-22 RX ORDER — ACETAMINOPHEN 325 MG/1
650 TABLET ORAL EVERY 6 HOURS
Status: DISCONTINUED | OUTPATIENT
Start: 2024-07-22 | End: 2024-07-24 | Stop reason: HOSPADM

## 2024-07-22 RX ORDER — LIDOCAINE HYDROCHLORIDE AND EPINEPHRINE 15; 5 MG/ML; UG/ML
INJECTION, SOLUTION EPIDURAL
Status: COMPLETED | OUTPATIENT
Start: 2024-07-22 | End: 2024-07-22

## 2024-07-22 RX ORDER — CALCIUM CARBONATE 500 MG/1
2 TABLET, CHEWABLE ORAL 3 TIMES DAILY PRN
Status: DISCONTINUED | OUTPATIENT
Start: 2024-07-22 | End: 2024-07-24 | Stop reason: HOSPADM

## 2024-07-22 RX ORDER — FAMOTIDINE 10 MG/ML
20 INJECTION, SOLUTION INTRAVENOUS ONCE AS NEEDED
Status: DISCONTINUED | OUTPATIENT
Start: 2024-07-22 | End: 2024-07-22 | Stop reason: HOSPADM

## 2024-07-22 RX ORDER — DOCUSATE SODIUM 100 MG/1
100 CAPSULE, LIQUID FILLED ORAL DAILY
Status: DISCONTINUED | OUTPATIENT
Start: 2024-07-23 | End: 2024-07-24 | Stop reason: HOSPADM

## 2024-07-22 RX ORDER — PROMETHAZINE HYDROCHLORIDE 25 MG/1
12.5 TABLET ORAL EVERY 6 HOURS PRN
Status: DISCONTINUED | OUTPATIENT
Start: 2024-07-22 | End: 2024-07-22 | Stop reason: HOSPADM

## 2024-07-22 RX ORDER — OXYTOCIN/0.9 % SODIUM CHLORIDE 30/500 ML
999 PLASTIC BAG, INJECTION (ML) INTRAVENOUS ONCE
Status: COMPLETED | OUTPATIENT
Start: 2024-07-22 | End: 2024-07-22

## 2024-07-22 RX ORDER — OXYTOCIN/0.9 % SODIUM CHLORIDE 30/500 ML
125 PLASTIC BAG, INJECTION (ML) INTRAVENOUS CONTINUOUS PRN
Status: DISCONTINUED | OUTPATIENT
Start: 2024-07-22 | End: 2024-07-24 | Stop reason: HOSPADM

## 2024-07-22 RX ORDER — OXYTOCIN/0.9 % SODIUM CHLORIDE 30/500 ML
1-20 PLASTIC BAG, INJECTION (ML) INTRAVENOUS
Status: DISCONTINUED | OUTPATIENT
Start: 2024-07-22 | End: 2024-07-22 | Stop reason: HOSPADM

## 2024-07-22 RX ORDER — FENTANYL CITRATE 50 UG/ML
INJECTION, SOLUTION INTRAMUSCULAR; INTRAVENOUS
Status: COMPLETED | OUTPATIENT
Start: 2024-07-22 | End: 2024-07-22

## 2024-07-22 RX ORDER — ROPIVACAINE HYDROCHLORIDE 2 MG/ML
INJECTION, SOLUTION EPIDURAL; INFILTRATION; PERINEURAL
Status: COMPLETED | OUTPATIENT
Start: 2024-07-22 | End: 2024-07-22

## 2024-07-22 RX ORDER — SODIUM CHLORIDE 0.9 % (FLUSH) 0.9 %
1-10 SYRINGE (ML) INJECTION AS NEEDED
Status: DISCONTINUED | OUTPATIENT
Start: 2024-07-22 | End: 2024-07-24 | Stop reason: HOSPADM

## 2024-07-22 RX ORDER — ACETAMINOPHEN 500 MG
1000 TABLET ORAL ONCE
Status: COMPLETED | OUTPATIENT
Start: 2024-07-22 | End: 2024-07-22

## 2024-07-22 RX ADMIN — SODIUM CHLORIDE, POTASSIUM CHLORIDE, SODIUM LACTATE AND CALCIUM CHLORIDE 125 ML/HR: 600; 310; 30; 20 INJECTION, SOLUTION INTRAVENOUS at 07:19

## 2024-07-22 RX ADMIN — IBUPROFEN 800 MG: 800 TABLET, FILM COATED ORAL at 18:19

## 2024-07-22 RX ADMIN — ONDANSETRON 4 MG: 2 INJECTION INTRAMUSCULAR; INTRAVENOUS at 07:27

## 2024-07-22 RX ADMIN — ACETAMINOPHEN 650 MG: 325 TABLET ORAL at 21:04

## 2024-07-22 RX ADMIN — MISOPROSTOL 400 MCG: 200 TABLET ORAL at 17:31

## 2024-07-22 RX ADMIN — ROPIVACAINE HYDROCHLORIDE 8 ML: 2 INJECTION, SOLUTION EPIDURAL; INFILTRATION; PERINEURAL at 14:48

## 2024-07-22 RX ADMIN — Medication 10 ML/HR: at 14:50

## 2024-07-22 RX ADMIN — SODIUM CHLORIDE, POTASSIUM CHLORIDE, SODIUM LACTATE AND CALCIUM CHLORIDE 1000 ML: 600; 310; 30; 20 INJECTION, SOLUTION INTRAVENOUS at 04:39

## 2024-07-22 RX ADMIN — Medication 1 MILLI-UNITS/MIN: at 05:18

## 2024-07-22 RX ADMIN — FENTANYL CITRATE 100 MCG: 50 INJECTION, SOLUTION INTRAMUSCULAR; INTRAVENOUS at 14:48

## 2024-07-22 RX ADMIN — HYDROCODONE BITARTRATE AND ACETAMINOPHEN 1 TABLET: 10; 325 TABLET ORAL at 19:50

## 2024-07-22 RX ADMIN — SODIUM CHLORIDE, POTASSIUM CHLORIDE, SODIUM LACTATE AND CALCIUM CHLORIDE 125 ML/HR: 600; 310; 30; 20 INJECTION, SOLUTION INTRAVENOUS at 05:16

## 2024-07-22 RX ADMIN — ONDANSETRON 4 MG: 4 TABLET, ORALLY DISINTEGRATING ORAL at 21:47

## 2024-07-22 RX ADMIN — LIDOCAINE HYDROCHLORIDE AND EPINEPHRINE 3 ML: 15; 5 INJECTION, SOLUTION EPIDURAL at 14:43

## 2024-07-22 RX ADMIN — Medication 250 ML/HR: at 18:51

## 2024-07-22 RX ADMIN — SODIUM CHLORIDE, POTASSIUM CHLORIDE, SODIUM LACTATE AND CALCIUM CHLORIDE 125 ML/HR: 600; 310; 30; 20 INJECTION, SOLUTION INTRAVENOUS at 14:10

## 2024-07-22 RX ADMIN — ACETAMINOPHEN 1000 MG: 500 TABLET ORAL at 09:59

## 2024-07-22 RX ADMIN — WITCH HAZEL: 500 SOLUTION RECTAL; TOPICAL at 19:50

## 2024-07-22 RX ADMIN — BENZOCAINE AND LEVOMENTHOL: 200; 5 SPRAY TOPICAL at 19:50

## 2024-07-22 NOTE — PROGRESS NOTES
OB Intrapartum Note    Subjective: No complaints, Pain controlled, Comfortable with epidural    Objective:  Baseline: Normal 110-160 bpm  Variability:   Moderate/Normal (amplitude 6-25 bpm)  Accelerations: Present (32 weeks+) 15 x 15 bpm  Decelerations: None  Contractions:  Regular, q2min, Pitocin at 10milliunits/min    Cervical exam:    Dilation: 5cm    Effacement: 80%    Station: 0/Engaged    Impression:    Category I  Reassuring fetus, Adequate progress, AROM, Clear fluid, Pain controlled    Plan:   Continue pitocin  Continue to monitor  Active labor positioning  Pelvis feels clinically adequate  Reviewed course/progress/plan with pt.  All questions answered to pts satisfaction.  Pt desires to proceed as outlined.        Electronically signed by Wendi Neal DO, 07/22/24, 3:26 PM EDT.

## 2024-07-22 NOTE — L&D DELIVERY NOTE
VAGINAL DELIVERY NOTE          Date: 7/22/2024   Time:  5:21 PM   GA: 39w1d    Infant: male  infant   3380 g (7 lb 7.2 oz)     APGARS: 9  @ 1 minute / 9  @ 5 minutes    Delivery: The patient progressed to complete, complete and pushed for a few contractions to deliver a viable infant in cephalic presentation weighing the above weight and above Apgars.  There was no nuchal cord.   The mouth and nares were bulb suctioned on the perineum.  The right anterior and left posterior shoulders were easily delivered without traction.  The remainder of the body delivered.  The infant was vigorous.  Delayed cord clamping for 60 seconds.  The cord was then clamped and cut.  Infant was placed on the maternal chest for recovery.  The placenta delivered intact with the assistance of fundal massage and maternal pushing efforts.      Small amount of retained membranes unable to be removed digitally.  Patient consented for banjo curettage which was performed without incident with removal of small amount of membranes.  Digital exam afterwards revealed no evidence of retained membranes by palpation.    On full evaluation of the perineum, vagina, cervix and rectum no repair was needed.  Small left labia minora abrasion, hemostatic, no repair needed.   External anal sphincter was intact.  200mcg of Cytotec was given orally and 200mcg given SL to assist with uterine tone.    Counts were correct.      Estimated Blood Loss: 100 mls    Complications: None         Electronically signed by Wendi Neal DO, 07/22/24, 5:57 PM EDT.       [facility] LABOR AND DELIVERY  [address] [place] [address] AVE  [place] [address]  Dept: [phone]  Dept Fax: [phone]  Loc: [phone]

## 2024-07-22 NOTE — ANESTHESIA PREPROCEDURE EVALUATION
Anesthesia Evaluation     Patient summary reviewed   no history of anesthetic complications:   NPO Solid Status: N/A  NPO Liquid Status: N/A           Airway   Mallampati: II  TM distance: >3 FB  Neck ROM: full  No difficulty expected  Dental - normal exam     Pulmonary - negative pulmonary ROS and normal exam   Cardiovascular - negative cardio ROS and normal exam  Exercise tolerance: good (4-7 METS)        Neuro/Psych  (+) psychiatric history Depression and Anxiety  GI/Hepatic/Renal/Endo    (+) obesity    Musculoskeletal (-) negative ROS    Abdominal  - normal exam   Substance History      OB/GYN    (+) Pregnant        Other - negative ROS         Other Comment: Maternal anemia               Anesthesia Plan    ASA 2     epidural       Anesthetic plan, risks, benefits, and alternatives have been provided, discussed and informed consent has been obtained with: patient.  Pre-procedure education provided  Plan discussed with CRNA.    CODE STATUS:    Code Status (Patient has no pulse and is not breathing): CPR (Attempt to Resuscitate)  Medical Interventions (Patient has pulse or is breathing): Full

## 2024-07-22 NOTE — DISCHARGE INSTRUCTIONS
DR. ENGLE'S POSTPARTUM DISCHARGE PRECAUTIONS and Answers to FAQs     NO SEX for SIX weeks.     NO TUB BATH or POOL for TWO week(s), shower only.  Sitz baths are fine.     STITCHES (if present):  wash them daily in the shower with soap and water (any type of soap is fine, it does not need to be antibacterial soap).  It is ok to gently put your finger in and around the vaginal area.  Look at your stitches (the ones on the outside) when you get home.  You will then know what is normal and can have a point of reference to compare it to if you start to have concerns.  REDNESS, PUS, increase in PAIN, FEVER or CHILLS are all reasons to be seen our office immediately.  Go to the ER, if it is after hours or a weekend.       VAGINAL BLEEDING:  may continue on and off over the next several weeks after delivery and may increase slightly once you go home.  You should not be bleeding more than 1 large pad soaked every hour or two.  Clots (even the size of a lemon or larger) may be normal as long as the bleeding is not heavy and the clots do not continue.       FEVER or CHILLS or NOT FEELING WELL: call our office.  If the office is closed, you need to be seen in acute care or ER.       CHEST PAIN or SHORTNESS OF BREATH/AIR: you need to GO TO THE NEAREST ER or CALL 911.      SWELLING: can increase over the next 7-10 days and then should slowly improve.  Your legs/ankles should be fairly similar in size.  A red, painful, hot, swollen leg (usually just one side) can be a sign of a blood clot and should be evaluated immediately.  Call our office.  If it is after hours or a weekend, you must be seen IMMEDIATELY IN THE ER.      ELEVATED BLOOD PRESSURE:  you need to contact us if you are having  persistent elevated BP systolic (top number) more than 155 or diastolic (bottom number) more than 95, or a headache (not relieved with rest, hydration or over the counter pain reliever), an increase in your swelling (usually hands and face),  changes in your vision (typically flashing white or black spots) or severe persistent pain in the location of the upper right side of your belly (under your right breast).  Call our office or go to ER if after hours or a weekend.     LACTATION QUESTIONS or CONCERNS?  Call [facility] Lactation Support [phone].     WORK and SCHOOL TIME OFF: depends on your specific delivery type, surrounding circumstances, and your work insurance/school rules.  If you have questions, please call Lizzie Zhong at [phone] (ext. [phone]).  Or email Lizzie at [email or web address].  They will assist in required paperwork for you and/or family members.      Any further QUESTIONS or CONCERNS, please call [facility] Andrews at [phone].

## 2024-07-22 NOTE — ANESTHESIA PROCEDURE NOTES
Labor Epidural    Pre-sedation assessment completed: 7/22/2024 2:30 PM    Patient reassessed immediately prior to procedure    Patient location during procedure: radiology  Start Time: 7/22/2024 2:30 PM  Stop Time: 7/22/2024 2:59 PM  Performed By  CRNA/CAA: Emili Butler CRNA  Preanesthetic Checklist  Completed: patient identified, IV checked, site marked, risks and benefits discussed, surgical consent, monitors and equipment checked, pre-op evaluation and timeout performed  Prep:  Pt Position:sitting  Sterile Tech:gloves and sterile barrier  Prep:povidone-iodine 7.5% surgical scrub  Monitoring:blood pressure monitoring and continuous pulse oximetry  Epidural Block Procedure:  Approach:midline  Guidance:landmark technique and palpation technique  Location:L3-L4  Needle Type:Tuohy  Needle Gauge:17 G  Loss of Resistance Medium: saline  Loss of Resistance: 6cm  Cath Depth at skin:11 cm  Paresthesia: none  Aspiration:negative  Test Dose:negative  Medication: ropivacaine (NAROPIN) 0.2 % injection - Injection   8 mL - 7/22/2024 2:48:00 PM  fentaNYL citrate (PF) (SUBLIMAZE) injection - Epidural   100 mcg - 7/22/2024 2:48:00 PM  lidocaine 1.5%-EPINEPHrine 1:200,000 (XYLOCAINE W/EPI) injection - Epidural   3 mL - 7/22/2024 2:43:00 PM  Number of Attempts: 1  Post Assessment:  Dressing:occlusive dressing applied and secured with tape  Pt Tolerance:patient tolerated the procedure well with no apparent complications  Complications:no

## 2024-07-22 NOTE — DISCHARGE SUMMARY
OB Discharge Summary        Admit Date:  2024  Date of Delivery: 2024   Discharge Date: 24    Reason for Admission/Chief Complaint: Scheduled IOL    Final Diagnosis:  39+1, IOL  History of precipitous delivery    Mildy elev BP in labor and PP  History of depression and anxiety-Zoloft 25mg, plans follow-up with PCP, status post pharmacogenetics testing in   To do at FU: Routine postpartum    Antepartum:  Prenatal care is complicated by:  See above Final Diagnosis for list    Intrapartum/Delivery:  OB Surgeon:  Dr. Wendi Neal, DO  Anesthesia: Epidural, not working well at delivery  Delivery Type:   Perineum : Abrasion, left labia minora, no repair needed  Feeding method: Breast    Infant: male  infant; Julio    Weight: 3380 g (7 lb 7.2 oz)      APGARS: 9  @ 1 minute / 9  @ 5 minutes    Hospital Course/Significant Findings:  Patient arrived at 4 AM on day of delivery for scheduled IOL.  Cervix 2 cm dilated.  Low-dose Pitocin started.  AROM.  Progressed rapidly.  Pushed only a couple times.  Uncomplicated .  BP mildly elevated in labor and PP.  Pt asymptomatic.  HTN prec provided.   On the day of discharge POSTPARTUM pt tolerating a regular diet, ambulating, pain well controlled, urinating spontaneously and lochia appropriate.   Vital signs were stable and afebrile.  Exam was within normal limits.  Fundus was below umbilicus and nontender.  Meeting discharge criteria and desired discharge home.  Postpartum instructions and FU reviewed and questions answered.    Results from last 7 days   Lab Units 24  0439   WBC 10*3/mm3 12.57*   HEMOGLOBIN g/dL 10.8*   HEMATOCRIT % 33.8*   PLATELETS 10*3/mm3 201             Results from last 7 days   Lab Units 24  0439   TREPONEMA PALLIDUM AB TOTAL  Non-Reactive           Discharge:         Discharge Medications        New Medications        Instructions Start Date   acetaminophen 325 MG tablet  Commonly known as: Tylenol   650 mg, Oral,  Every 6 Hours PRN      docusate sodium 100 MG capsule  Commonly known as: Colace   100 mg, Oral, 2 Times Daily PRN      Hydrocortisone Ace-Pramoxine 1-1 % rectal cream   Rectal, 3 Times Daily PRN      ibuprofen 600 MG tablet  Commonly known as: ADVIL,MOTRIN   600 mg, Oral, Every 6 Hours PRN      sertraline 25 MG tablet  Commonly known as: Zoloft   25 mg, Oral, Daily      traMADol 50 MG tablet  Commonly known as: ULTRAM   50 mg, Oral, Every 6 Hours PRN             Stop These Medications      famotidine 20 MG tablet  Commonly known as: Pepcid     ondansetron 4 MG tablet  Commonly known as: Zofran     prenatal vitamin 27-0.8 27-0.8 MG tablet tablet     promethazine 25 MG tablet  Commonly known as: PHENERGAN                Disposition: Home  Diet: Regular    Activity: Pelvic rest 6 weeks    Condition at discharge: Good    Follow up with: Dr. Wendi Neal DO or provider of her choice    Follow up in: 1 week BP check    Complications: None       Signature: Electronically signed by Wendi Neal DO, 07/24/24, 8:59 AM EDT.        [facility]  [address] [place] [address]  Dept: [phone]  Dept Fax: [phone]  Loc: [phone]

## 2024-07-23 PROCEDURE — 0503F POSTPARTUM CARE VISIT: CPT | Performed by: OBSTETRICS & GYNECOLOGY

## 2024-07-23 RX ORDER — HYDROCORTISONE ACETATE PRAMOXINE HCL 1; 1 G/100G; G/100G
CREAM TOPICAL 3 TIMES DAILY PRN
Qty: 30 G | Refills: 1 | Status: SHIPPED | OUTPATIENT
Start: 2024-07-23

## 2024-07-23 RX ORDER — IBUPROFEN 600 MG/1
600 TABLET ORAL EVERY 6 HOURS PRN
Qty: 30 TABLET | Refills: 1 | Status: SHIPPED | OUTPATIENT
Start: 2024-07-23

## 2024-07-23 RX ORDER — SERTRALINE HYDROCHLORIDE 25 MG/1
25 TABLET, FILM COATED ORAL DAILY
Qty: 30 TABLET | Refills: 1 | Status: SHIPPED | OUTPATIENT
Start: 2024-07-23

## 2024-07-23 RX ORDER — ACETAMINOPHEN 325 MG/1
650 TABLET ORAL EVERY 6 HOURS PRN
Qty: 30 TABLET | Refills: 1 | Status: SHIPPED | OUTPATIENT
Start: 2024-07-23

## 2024-07-23 RX ORDER — DOCUSATE SODIUM 100 MG/1
100 CAPSULE, LIQUID FILLED ORAL 2 TIMES DAILY PRN
Qty: 60 CAPSULE | Refills: 1 | Status: SHIPPED | OUTPATIENT
Start: 2024-07-23

## 2024-07-23 RX ADMIN — ACETAMINOPHEN 650 MG: 325 TABLET ORAL at 20:37

## 2024-07-23 RX ADMIN — IBUPROFEN 600 MG: 600 TABLET, FILM COATED ORAL at 18:39

## 2024-07-23 RX ADMIN — TRAMADOL HYDROCHLORIDE 50 MG: 50 TABLET, FILM COATED ORAL at 15:44

## 2024-07-23 RX ADMIN — IBUPROFEN 600 MG: 600 TABLET, FILM COATED ORAL at 06:21

## 2024-07-23 RX ADMIN — ACETAMINOPHEN 650 MG: 325 TABLET ORAL at 03:22

## 2024-07-23 RX ADMIN — IBUPROFEN 600 MG: 600 TABLET, FILM COATED ORAL at 01:46

## 2024-07-23 RX ADMIN — ACETAMINOPHEN 650 MG: 325 TABLET ORAL at 15:44

## 2024-07-23 RX ADMIN — BENZOCAINE AND LEVOMENTHOL: 200; 5 SPRAY TOPICAL at 15:44

## 2024-07-23 RX ADMIN — IBUPROFEN 600 MG: 600 TABLET, FILM COATED ORAL at 12:00

## 2024-07-23 NOTE — PLAN OF CARE
Goal Outcome Evaluation:  Plan of Care Reviewed With: patient        Progress: improving  Outcome Evaluation: Pt pain and nausea conttolled, fundus firm without massage, voiding and ambulating, VSS, Pt resting in bed, is able to make needs known, call light inreach, will continue plan of care

## 2024-07-23 NOTE — PROGRESS NOTES
PostPartum/PostOp PROGRESS NOTE        Subjective:  Patient has no complaints  Ambulating  Urinating spontaneously  Lochia decreasing, no bleeding concerns    Objective:     Temp:  [97.9 °F (36.6 °C)-99.9 °F (37.7 °C)] 97.9 °F (36.6 °C)  Heart Rate:  [] 64  Resp:  [16-18] 18  BP: (110-152)/(59-93) 126/83  General- NAD, alert and oriented, appropriate  Psych- Normal mood, good memory  Cardiovascular/Respiratory- Not examined  Abdomen- Fundus firm, non tender and Fundus at U-1  Extremties/DTRs- +1 edema, bilaterally equal, no signs of DVT    Results from last 7 days   Lab Units 07/22/24  0439   WBC 10*3/mm3 12.57*   HEMOGLOBIN g/dL 10.8*   HEMATOCRIT % 33.8*   PLATELETS 10*3/mm3 201           Results from last 7 days   Lab Units 07/22/24  0439   TREPONEMA PALLIDUM AB TOTAL  Non-Reactive     Assessment:    Post-partum/postop Day:  1  The patient is not currently breastfeeding.      Plan:   Routine postpartum/postop care  Shower  PO pain meds  DC meds reviewed  Follow up scheduled  PP/PO precautions given  OB Hospitalist will see pt tomorrow and until discharge.  Plan discharge tomorrow              Electronically signed by Wendi Neal DO, 07/23/24, 7:41 AM EDT.

## 2024-07-23 NOTE — PLAN OF CARE
Goal Outcome Evaluation:              Outcome Evaluation: pt is up ad radha, voiding well . assessment wnl. pain controlled with po meds and heat/ice. bondng well with infant. appetite good.

## 2024-07-24 VITALS
SYSTOLIC BLOOD PRESSURE: 139 MMHG | WEIGHT: 245 LBS | HEART RATE: 76 BPM | RESPIRATION RATE: 18 BRPM | OXYGEN SATURATION: 100 % | DIASTOLIC BLOOD PRESSURE: 88 MMHG | TEMPERATURE: 98.4 F | HEIGHT: 72 IN | BODY MASS INDEX: 33.18 KG/M2

## 2024-07-24 PROBLEM — R03.0 ELEVATED BLOOD PRESSURE READING: Status: ACTIVE | Noted: 2024-07-24

## 2024-07-24 PROCEDURE — 0503F POSTPARTUM CARE VISIT: CPT | Performed by: OBSTETRICS & GYNECOLOGY

## 2024-07-24 RX ORDER — TRAMADOL HYDROCHLORIDE 50 MG/1
50 TABLET ORAL EVERY 6 HOURS PRN
Qty: 4 TABLET | Refills: 0 | Status: SHIPPED | OUTPATIENT
Start: 2024-07-24

## 2024-07-24 RX ADMIN — IBUPROFEN 600 MG: 600 TABLET, FILM COATED ORAL at 06:24

## 2024-07-24 RX ADMIN — WITCH HAZEL: 500 SOLUTION RECTAL; TOPICAL at 16:06

## 2024-07-24 RX ADMIN — DOCUSATE SODIUM 100 MG: 100 CAPSULE, LIQUID FILLED ORAL at 08:41

## 2024-07-24 RX ADMIN — IBUPROFEN 600 MG: 600 TABLET, FILM COATED ORAL at 00:39

## 2024-07-24 RX ADMIN — ACETAMINOPHEN 650 MG: 325 TABLET ORAL at 15:04

## 2024-07-24 RX ADMIN — BENZOCAINE AND LEVOMENTHOL: 200; 5 SPRAY TOPICAL at 16:06

## 2024-07-24 RX ADMIN — ACETAMINOPHEN 650 MG: 325 TABLET ORAL at 08:41

## 2024-07-24 RX ADMIN — ACETAMINOPHEN 650 MG: 325 TABLET ORAL at 03:02

## 2024-07-24 NOTE — PLAN OF CARE
Goal Outcome Evaluation:  Plan of Care Reviewed With: patient           Outcome Evaluation: pt doing her own and infant care. assessment wnl. pain controlled. postpartum appt made. ready to dc home with infant later today.

## 2024-07-24 NOTE — PLAN OF CARE
Goal Outcome Evaluation:  Plan of Care Reviewed With: patient        Progress: improving  Outcome Evaluation: Pt pain controlled, fundus firm without massage, voiding and ambulating, VSS, will continue plan of care

## 2024-07-24 NOTE — ANESTHESIA POSTPROCEDURE EVALUATION
Patient: Nayla Rodriguez    Procedure Summary       Date: 07/22/24 Room / Location:     Anesthesia Start: 1436 Anesthesia Stop: 1721    Procedure: LABOR ANALGESIA Diagnosis:     Scheduled Providers:  Provider: Emili Butler CRNA    Anesthesia Type: epidural ASA Status: 2            Anesthesia Type: epidural    Vitals  Vitals Value Taken Time   /80 07/23/24 1855   Temp 36.9 °C (98.4 °F) 07/23/24 1855   Pulse 70 07/23/24 1855   Resp 18 07/23/24 1855   SpO2 100 % 07/22/24 1730   Vitals shown include unfiled device data.        Post Anesthesia Care and Evaluation    Patient location during evaluation: bedside  Patient participation: complete - patient participated  Level of consciousness: awake  Pain score: 0  Pain management: adequate    Airway patency: patent  Anesthetic complications: No anesthetic complications  PONV Status: none  Cardiovascular status: acceptable  Respiratory status: acceptable  Hydration status: acceptable  Post Neuraxial Block status: Motor and sensory function returned to baseline and No signs or symptoms of PDPHNo anesthesia care post op

## 2024-08-09 ENCOUNTER — PATIENT OUTREACH (OUTPATIENT)
Dept: LABOR AND DELIVERY | Facility: HOSPITAL | Age: 24
End: 2024-08-09
Payer: COMMERCIAL

## 2024-08-09 NOTE — OUTREACH NOTE
Motherhood Connection  Postpartum Check-In    Questions/Answers      Flowsheet Row Responses   Visit Setting Telephone   Best Method for Contacting Cell   OB Discharge Note Reviewed  Reviewed   OB Discharge Navigator Reviewed  Reviewed   OB Discharge Medications Reviewed  Reviewed    discharged home with mother? Yes   Current Pain Levels 0-10 0   At Rest Pain Levels 0-10 0   Pain level with activity 0-10 0   Acceptable Pain Level 0-10 0   Verbalized Emotional State Acceptance   Family/Support Network Family, Significant Other   Level of Involvement in Care Attentive, Interactive, Supportive   Do you feel comfortable in your relationship with your baby? Yes   Have members of your household adjusted to your baby? Yes   Is the baby's father supportive and/or involved with the baby? Yes   How does your partner feel about the baby? Happy, Involved   Do you feel safe at home, school and work? Yes   Are you in a relationship with someone who threatens you or hurts you? No   Do you have the resources to keep yourself and your baby healthy and safe? Yes   Amount Spotting   Number of pads per day 3   Lochia Odor None   Is patient breastfeeding? No   Postpartum Depression Screening Education Education Provided   Doctor Appointments: Education Provided   Family Planning Education Education Provided   Postpartum Care Education Education Provided   S & S to report Education Provided   Followup Appointments Made Yes   Well Child Visit Appointments Made Yes   Did you complete the visit? Yes   Were there any specific concerns? No   Umbilical Cord No reported signs or symptoms   Was the baby circumcised? Yes   Circumcision care and signs/symptoms to report Reviewed   Infant Feeding Method Formula   Formula Type Other   Other Formula similac sensitive   Formula PO (mL) 2.5-3oz   Formula/Expressed Milk frequency of feedings: q2-3h   Number of wet diapers x 24 hours 10   Last BM x 24 hours 4   What safe sleep surface is  available? Bassinet   Are there stuffed animals, toys, pillows, quilts, blankets, wedges, positioners, bumpers or other loose bedding in the infant's sleeping environment? No   Where does the baby usually sleep? Bassinet   Does the baby ever share a sleep surface with a sibling, adult or pet? No   Does the baby ever share a sleep surface in a bed, couch, recliner or other? No   What position do you place your baby to sleep for naps? Back   What position do you place your baby to sleep at night Back   Are you and/or other caregivers smoking inside or outside the baby's home? No   Is the infant dressed appropiately for the temperature of the home? Yes   Do you use a clean, dry pacifier that is not attached to a string or stuffed animal? Yes            Review of Systems   All other systems reviewed and are negative.    Most Recent [place]  Depression Scale Score (EPDS)    Performed by a clinician: 2 (2024 12:58 PM)      Doing well. No current questions, needs or concerns.     5 Ps Screen  complete      Sonia Mitchell RN  Maternity Nurse Navigator    2024, 13:02 EDT

## 2024-08-16 ENCOUNTER — PATIENT OUTREACH (OUTPATIENT)
Dept: CALL CENTER | Facility: HOSPITAL | Age: 24
End: 2024-08-16
Payer: COMMERCIAL

## 2024-08-16 NOTE — OUTREACH NOTE
Motherhood Connection Survey      Flowsheet Row Responses   [facility] patient discharged from? [facility]   Week 1 attempt successful? Yes   Call end time 1512   Baby sex Boy    discharged home with mother? Yes   Baby sex Boy   Delivery type Vaginal   Emotional state Acceptance   Family support Yes   Do you have all necessary resources to care for you and your baby?  Yes   Have members of your household adjusted to your baby? Yes   Did you have any problems with pre-eclampsia during this pregnancy? No   Did you have blood glucose issues during this pregnancy No   Lochia amount Light   Lochia per patient report Serosa   Feeding Method Bottle   Frequency every 3 hours   Amount 3-4 oz   Signs baby is ready to eat Crying   Feeding tolerance Wet/dirty diapers, Weight gain   Number of wet diapers x 24 hours 8-10   Last BM x 24 hours 3-4   Umbilical Cord No reported signs or symptoms   Was the baby circumcised? Yes   Circumcision care and signs/symptoms to report Reviewed   Where does the baby usually sleep? Bassinet   Are there stuffed animals, toys, pillows, quilts, blankets, wedges, positioners, bumpers or other loose bedding in the infant's sleeping environment? No   Does the baby ever share a sleep surface in a bed, couch, recliner or other? No   What position do you lay your baby down to sleep? Back   Are you and/or other caregivers smoking inside or outside the baby's home? No   Mom appointment comments: Mother has her appointment scheduled   Baby appointment comments: Baby has been to the pediatrician   Call completed? Yes   How satisfied were you with the Motherhood Connection Program? 5              Gale RODRIGUEZ - Licensed Nurse

## 2024-08-19 PROBLEM — Z34.90 ENCOUNTER FOR INDUCTION OF LABOR: Status: RESOLVED | Noted: 2024-07-22 | Resolved: 2024-08-19

## 2024-08-19 PROBLEM — Z34.80 SUPERVISION OF OTHER NORMAL PREGNANCY, ANTEPARTUM: Status: RESOLVED | Noted: 2023-12-01 | Resolved: 2024-08-19

## 2024-08-19 PROBLEM — R03.0 ELEVATED BLOOD PRESSURE READING: Status: RESOLVED | Noted: 2024-07-24 | Resolved: 2024-08-19

## 2024-08-26 ENCOUNTER — TELEPHONE (OUTPATIENT)
Dept: OBSTETRICS AND GYNECOLOGY | Facility: CLINIC | Age: 24
End: 2024-08-26
Payer: COMMERCIAL

## 2024-08-28 ENCOUNTER — TELEPHONE (OUTPATIENT)
Dept: OBSTETRICS AND GYNECOLOGY | Facility: CLINIC | Age: 24
End: 2024-08-28
Payer: COMMERCIAL

## 2024-10-08 ENCOUNTER — OFFICE VISIT (OUTPATIENT)
Dept: OBSTETRICS AND GYNECOLOGY | Facility: CLINIC | Age: 24
End: 2024-10-08
Payer: COMMERCIAL

## 2024-10-08 VITALS
HEART RATE: 93 BPM | BODY MASS INDEX: 32.37 KG/M2 | SYSTOLIC BLOOD PRESSURE: 163 MMHG | WEIGHT: 239 LBS | HEIGHT: 72 IN | DIASTOLIC BLOOD PRESSURE: 88 MMHG

## 2024-10-08 DIAGNOSIS — Z30.09 ENCOUNTER FOR OTHER GENERAL COUNSELING OR ADVICE ON CONTRACEPTION: Primary | ICD-10-CM

## 2024-10-08 NOTE — PROGRESS NOTES
"GYN Problem/Follow Up Visit    Chief Complaint   Patient presents with    DISCUSS BC           HPI  Nayla Rodriguez is a 24 y.o. female, , who presents for bc discussion. Had baby three months ago. No concerns.        Additional OB/GYN History   No LMP recorded.  Current contraception: contraceptive methods: None  Desires to: start contraception  Allergies : Patient has no known allergies.     The additional following portions of the patient's history were reviewed and updated as appropriate: allergies, current medications, past family history, past medical history, past social history, past surgical history, and problem list.    Review of Systems    I have reviewed and agree with the HPI, ROS, and historical information as entered above. Celsa Guzman, APRN    Objective   /88   Pulse 93   Ht 182.9 cm (72\")   Wt 108 kg (239 lb)   Breastfeeding No   BMI 32.41 kg/m²     Physical Exam  Vitals reviewed.   Neurological:      Mental Status: She is alert and oriented to person, place, and time.            Assessment and Plan    Diagnoses and all orders for this visit:    1. Encounter for other general counseling or advice on contraception (Primary)  -     hCG, Quantitative, Pregnancy; Future    Discussed r/b/se of all bc options. Pt desires nexplanon. No intercourse x 2 weeks before appt. Beta day before appt. Discussed elevated blood pressure. Pt states it has been elevated a little ever since pregnancy. She did not keep her pp visit. Encouraged pt to f/u with obgyn doc or pcp for bp eval/mgmt.     Counseling:  She understands the importance of having the above orders performed in a timely fashion.  She is encouraged to review her results online and/or contact or office if she has questions.     Follow Up:  Return for nexplanon insertion, beta day before.      CHRIS Street  10/08/2024  "

## 2024-11-04 ENCOUNTER — LAB (OUTPATIENT)
Dept: OBSTETRICS AND GYNECOLOGY | Facility: CLINIC | Age: 24
End: 2024-11-04
Payer: COMMERCIAL

## 2024-11-04 DIAGNOSIS — Z30.09 ENCOUNTER FOR OTHER GENERAL COUNSELING OR ADVICE ON CONTRACEPTION: ICD-10-CM

## 2024-11-04 LAB — HCG INTACT+B SERPL-ACNC: <1 MIU/ML

## 2024-11-04 PROCEDURE — 84702 CHORIONIC GONADOTROPIN TEST: CPT | Performed by: OBSTETRICS & GYNECOLOGY

## 2024-11-05 ENCOUNTER — PROCEDURE VISIT (OUTPATIENT)
Dept: OBSTETRICS AND GYNECOLOGY | Facility: CLINIC | Age: 24
End: 2024-11-05
Payer: COMMERCIAL

## 2024-11-05 VITALS
HEIGHT: 72 IN | SYSTOLIC BLOOD PRESSURE: 131 MMHG | WEIGHT: 238 LBS | HEART RATE: 107 BPM | BODY MASS INDEX: 32.23 KG/M2 | DIASTOLIC BLOOD PRESSURE: 76 MMHG

## 2024-11-05 DIAGNOSIS — Z30.017 NEXPLANON INSERTION: Primary | ICD-10-CM

## 2024-11-05 NOTE — PROGRESS NOTES
Subdermal Contraceptive Implant Insertion Note    Nayla Rodriguez desires a subdermal etonogestrel contraceptive implant insertion.  She has been counseled regarding the risks, benefits and alternatives to the implant.  She especially understands that her menstrual periods are expected to become irregular and unpredictable throughout the time she is using the implant.  She has no contraindications to the insertion.  Her questions have been answered.  She has fully reviewed the FDA-approved consent brochure, has signed the consent form, and wishes to proceed with the insertion today.     Current method of contraception:  no method    No LMP recorded.    Beta negative.     Procedure Time Out Documentation       Procedure Details  The inner side of the left arm was cleaned with Betadinex3 and infiltrated with 1% lidocaine.  The contraceptive ethan was inserted according to the 's instructions without complications.  The ethan was palpable under the skin after the insertion.  The insertion site was closed with Band-Aid and a pressure dressing was applied.    Lot:  [id number]  Exp:  10/26  NDC:  09557-253-54    Nayla was given post-insertion instructions.  She understands that the implant must be removed at the end of three years and may be removed sooner if she wishes.    Successful insertion of nexplanon device.    Patient tolerated the procedure well without complications.

## 2024-11-12 ENCOUNTER — OFFICE VISIT (OUTPATIENT)
Dept: FAMILY MEDICINE CLINIC | Facility: CLINIC | Age: 24
End: 2024-11-12
Payer: COMMERCIAL

## 2024-11-12 VITALS
HEIGHT: 72 IN | DIASTOLIC BLOOD PRESSURE: 92 MMHG | BODY MASS INDEX: 31.97 KG/M2 | WEIGHT: 236 LBS | SYSTOLIC BLOOD PRESSURE: 154 MMHG | OXYGEN SATURATION: 96 % | HEART RATE: 68 BPM | TEMPERATURE: 97.4 F

## 2024-11-12 DIAGNOSIS — Z13.220 SCREENING FOR LIPID DISORDERS: ICD-10-CM

## 2024-11-12 DIAGNOSIS — Z00.00 ANNUAL PHYSICAL EXAM: Primary | ICD-10-CM

## 2024-11-12 DIAGNOSIS — B07.8 OTHER VIRAL WARTS: ICD-10-CM

## 2024-11-12 DIAGNOSIS — I10 PRIMARY HYPERTENSION: ICD-10-CM

## 2024-11-12 DIAGNOSIS — F41.9 ANXIETY: ICD-10-CM

## 2024-11-12 DIAGNOSIS — F33.0 MILD EPISODE OF RECURRENT MAJOR DEPRESSIVE DISORDER: ICD-10-CM

## 2024-11-12 PROCEDURE — 2014F MENTAL STATUS ASSESS: CPT

## 2024-11-12 PROCEDURE — 1125F AMNT PAIN NOTED PAIN PRSNT: CPT

## 2024-11-12 PROCEDURE — 99395 PREV VISIT EST AGE 18-39: CPT

## 2024-11-12 RX ORDER — SERTRALINE HYDROCHLORIDE 25 MG/1
25 TABLET, FILM COATED ORAL DAILY
Qty: 90 TABLET | Refills: 1 | Status: SHIPPED | OUTPATIENT
Start: 2024-11-12

## 2024-11-12 NOTE — PROGRESS NOTES
"Chief Complaint  No chief complaint on file.    Subjective        Nayla Rodriguez presents to [facility]  History of Present Illness  Inge is being seen in the office today for high blood pressure for the last 2 months of her pregnancy. She also states that it has continued to stay high since given birth 3 months ago. Inge states that she has been taking her blood pressure at home in the mornings, before she begins her day. She states that her pressures have been running around 150 systolic and 80 to 90 diastolic. When asked about symptoms such as headaches or increased heart rate, she states that she will sometimes have a mild headache and when she exerts herself she can become short of breath.   She would also like to restart her Zoloft that she was on before her pregnancy. She stated that she felt like the medication would work, but she was not taking it consistently and would like to try to take it again.    Inge would also like to discuss starting on phentermine for weight loss.   Her last concern is that she has had a wart on her ankle since she was a child and would like to have it removed. When asked if she had tried any OTC wart removers, she stated that she had not.     She has no other concerns at this time. Catherine Shah, CHRIS Student       Objective   Vital Signs:  /92 (BP Location: Left arm, Patient Position: Sitting, Cuff Size: Adult)   Pulse 68   Temp 97.4 °F (36.3 °C)   Ht 182.9 cm (72.01\")   Wt 107 kg (236 lb)   SpO2 96%   BMI 32.00 kg/m²   Estimated body mass index is 32 kg/m² as calculated from the following:    Height as of this encounter: 182.9 cm (72.01\").    Weight as of this encounter: 107 kg (236 lb).    BMI is >= 30 and <35. (Class 1 Obesity). The following options were offered after discussion;: weight loss educational material (shared in after visit summary), exercise counseling/recommendations, and pharmacological intervention " options      Physical Exam  Constitutional:       Appearance: Normal appearance.   HENT:      Head: Normocephalic.   Cardiovascular:      Rate and Rhythm: Normal rate and regular rhythm.      Pulses: Normal pulses.      Heart sounds: Normal heart sounds.   Pulmonary:      Effort: Pulmonary effort is normal.      Breath sounds: Normal breath sounds.   Skin:     General: Skin is warm and dry.   Neurological:      General: No focal deficit present.      Mental Status: She is alert and oriented to person, place, and time.   Psychiatric:         Mood and Affect: Mood normal.         Behavior: Behavior normal.        Result Review :         Cryotherapy, Skin Lesion    Date/Time: 11/12/2024 12:40 PM    Performed by: Bridgett Chin APRN  Authorized by: Bridgett Chin APRN  Preparation: Patient was prepped and draped in the usual sterile fashion.  Local anesthesia used: no    Anesthesia:  Local anesthesia used: no    Sedation:  Patient sedated: no    Patient tolerance: patient tolerated the procedure well with no immediate complications  Comments: Wart on right ankle            Assessment and Plan   Diagnoses and all orders for this visit:    1. Annual physical exam (Primary)  -     CBC Auto Differential; Future  -     Comprehensive Metabolic Panel; Future  -     TSH Rfx On Abnormal To Free T4; Future  -     Urinalysis With Culture If Indicated -; Future    2. Primary hypertension  Comments:  Question whether anxiety/stress related. Monitory BP at home, treat anxiety, get blood work drawn in 1 month. treat if needed.    3. Other viral warts  Comments:  Did perform cryotherapy for wart, did discuss if not eliminated may ultimately need to go to derm.  Orders:  -     Cryotherapy, Skin Lesion    4. Anxiety  Comments:  Start back on zoloft, discussed give 4-6 weeks to get in system, may need to increase. i do feel like contributing to hypertension as well.    5. Mild episode of recurrent major depressive disorder    6.  Screening for lipid disorders  -     Lipid Panel; Future    Other orders  -     sertraline (Zoloft) 25 MG tablet; Take 1 tablet by mouth Daily.  Dispense: 90 tablet; Refill: 1      Anticipatory Guidelines discussed with patient.     Discussed getting adequate exercise, reduced TV/electronic time, car safety including seat belt use, sexual activity (if applicable), and smoking/alcohol use (if applicable).           Follow Up   Return in about 6 weeks (around 12/24/2024).  Patient was given instructions and counseling regarding her condition or for health maintenance advice. Please see specific information pulled into the AVS if appropriate.

## 2025-01-13 ENCOUNTER — TELEPHONE (OUTPATIENT)
Dept: FAMILY MEDICINE CLINIC | Facility: CLINIC | Age: 25
End: 2025-01-13
Payer: COMMERCIAL

## 2025-01-14 ENCOUNTER — TELEPHONE (OUTPATIENT)
Dept: FAMILY MEDICINE CLINIC | Facility: CLINIC | Age: 25
End: 2025-01-14

## 2025-01-14 NOTE — TELEPHONE ENCOUNTER
Relay  [facility]  Patient missed their appointment scheduled on 1/14/25 with Bridgett Chin.  Would patient like to be rescheduled?  No show letter sent to patient either via mychart or mail.